# Patient Record
Sex: FEMALE | Race: OTHER
[De-identification: names, ages, dates, MRNs, and addresses within clinical notes are randomized per-mention and may not be internally consistent; named-entity substitution may affect disease eponyms.]

---

## 2017-06-14 ENCOUNTER — RX RENEWAL (OUTPATIENT)
Age: 42
End: 2017-06-14

## 2018-02-20 ENCOUNTER — RX RENEWAL (OUTPATIENT)
Age: 43
End: 2018-02-20

## 2018-09-13 ENCOUNTER — APPOINTMENT (OUTPATIENT)
Dept: UROLOGY | Facility: CLINIC | Age: 43
End: 2018-09-13
Payer: COMMERCIAL

## 2018-09-13 DIAGNOSIS — N72 INFLAMMATORY DISEASE OF CERVIX UTERI: ICD-10-CM

## 2018-09-13 PROCEDURE — 99214 OFFICE O/P EST MOD 30 MIN: CPT

## 2018-09-15 LAB — BACTERIA UR CULT: NORMAL

## 2018-09-17 LAB
APPEARANCE: CLEAR
BACTERIA: NEGATIVE
BILIRUBIN URINE: NEGATIVE
BLOOD URINE: ABNORMAL
COLOR: YELLOW
CORE LAB FLUID CYTOLOGY: NORMAL
GLUCOSE QUALITATIVE U: NEGATIVE MG/DL
HYALINE CASTS: 1 /LPF
KETONES URINE: NEGATIVE
LEUKOCYTE ESTERASE URINE: NEGATIVE
MICROSCOPIC-UA: NORMAL
NITRITE URINE: NEGATIVE
PH URINE: 5.5
PROTEIN URINE: 30 MG/DL
RED BLOOD CELLS URINE: 7 /HPF
SPECIFIC GRAVITY URINE: 1.01
SQUAMOUS EPITHELIAL CELLS: 5 /HPF
UROBILINOGEN URINE: NEGATIVE MG/DL
WHITE BLOOD CELLS URINE: 8 /HPF

## 2018-09-20 LAB — CORE LAB FLUID CYTOLOGY: NORMAL

## 2018-09-22 LAB
APPEARANCE: CLEAR
BACTERIA: NEGATIVE
BILIRUBIN URINE: NEGATIVE
BLOOD URINE: NEGATIVE
COLOR: YELLOW
GLUCOSE QUALITATIVE U: NEGATIVE MG/DL
HYALINE CASTS: 1 /LPF
KETONES URINE: NEGATIVE
LEUKOCYTE ESTERASE URINE: NEGATIVE
MICROSCOPIC-UA: NORMAL
NITRITE URINE: NEGATIVE
PH URINE: 6
PROTEIN URINE: NEGATIVE MG/DL
RED BLOOD CELLS URINE: 10 /HPF
SPECIFIC GRAVITY URINE: 1.02
SQUAMOUS EPITHELIAL CELLS: 8 /HPF
UROBILINOGEN URINE: NEGATIVE MG/DL
WHITE BLOOD CELLS URINE: 5 /HPF

## 2018-10-01 ENCOUNTER — APPOINTMENT (OUTPATIENT)
Dept: UROLOGY | Facility: CLINIC | Age: 43
End: 2018-10-01
Payer: COMMERCIAL

## 2018-10-01 LAB
APPEARANCE: CLEAR
BACTERIA: NEGATIVE
BILIRUBIN URINE: NEGATIVE
BLOOD URINE: ABNORMAL
COLOR: YELLOW
GLUCOSE QUALITATIVE U: NEGATIVE MG/DL
HYALINE CASTS: 0 /LPF
KETONES URINE: NEGATIVE
LEUKOCYTE ESTERASE URINE: NEGATIVE
MICROSCOPIC-UA: NORMAL
NITRITE URINE: NEGATIVE
PH URINE: 6
PROTEIN URINE: NEGATIVE MG/DL
RED BLOOD CELLS URINE: 26 /HPF
SPECIFIC GRAVITY URINE: 1.02
SQUAMOUS EPITHELIAL CELLS: 2 /HPF
UROBILINOGEN URINE: NEGATIVE MG/DL
WHITE BLOOD CELLS URINE: 0 /HPF

## 2018-10-01 PROCEDURE — 99214 OFFICE O/P EST MOD 30 MIN: CPT

## 2018-10-02 LAB — CORE LAB FLUID CYTOLOGY: NORMAL

## 2018-10-03 ENCOUNTER — APPOINTMENT (OUTPATIENT)
Dept: UROLOGY | Facility: CLINIC | Age: 43
End: 2018-10-03

## 2018-10-03 LAB — BACTERIA UR CULT: NORMAL

## 2018-10-08 ENCOUNTER — APPOINTMENT (OUTPATIENT)
Dept: UROLOGY | Facility: CLINIC | Age: 43
End: 2018-10-08

## 2018-11-06 ENCOUNTER — EMERGENCY (EMERGENCY)
Facility: HOSPITAL | Age: 43
LOS: 1 days | Discharge: HOME | End: 2018-11-06
Attending: EMERGENCY MEDICINE
Payer: COMMERCIAL

## 2018-11-06 VITALS
OXYGEN SATURATION: 100 % | SYSTOLIC BLOOD PRESSURE: 112 MMHG | RESPIRATION RATE: 18 BRPM | DIASTOLIC BLOOD PRESSURE: 75 MMHG | HEART RATE: 78 BPM | TEMPERATURE: 99 F

## 2018-11-06 VITALS
SYSTOLIC BLOOD PRESSURE: 120 MMHG | HEIGHT: 65 IN | TEMPERATURE: 98 F | RESPIRATION RATE: 18 BRPM | WEIGHT: 134.92 LBS | OXYGEN SATURATION: 98 % | DIASTOLIC BLOOD PRESSURE: 81 MMHG | HEART RATE: 110 BPM

## 2018-11-06 LAB
ALBUMIN SERPL ELPH-MCNC: 4 G/DL — SIGNIFICANT CHANGE UP (ref 3.3–5)
ALP SERPL-CCNC: 59 U/L — SIGNIFICANT CHANGE UP (ref 40–120)
ALT FLD-CCNC: 14 U/L — SIGNIFICANT CHANGE UP (ref 10–45)
ANION GAP SERPL CALC-SCNC: 15 MMOL/L — SIGNIFICANT CHANGE UP (ref 5–17)
ANISOCYTOSIS BLD QL: SLIGHT — SIGNIFICANT CHANGE UP
APTT BLD: 22.1 SEC — LOW (ref 27.5–36.3)
AST SERPL-CCNC: 60 U/L — HIGH (ref 10–40)
BASOPHILS # BLD AUTO: 0 K/UL — SIGNIFICANT CHANGE UP (ref 0–0.2)
BASOPHILS NFR BLD AUTO: 1 % — SIGNIFICANT CHANGE UP (ref 0–2)
BILIRUB SERPL-MCNC: 0.2 MG/DL — SIGNIFICANT CHANGE UP (ref 0.2–1.2)
BLD GP AB SCN SERPL QL: NEGATIVE — SIGNIFICANT CHANGE UP
BUN SERPL-MCNC: 8 MG/DL — SIGNIFICANT CHANGE UP (ref 7–23)
CALCIUM SERPL-MCNC: 9 MG/DL — SIGNIFICANT CHANGE UP (ref 8.4–10.5)
CHLORIDE SERPL-SCNC: 102 MMOL/L — SIGNIFICANT CHANGE UP (ref 96–108)
CO2 SERPL-SCNC: 22 MMOL/L — SIGNIFICANT CHANGE UP (ref 22–31)
CREAT SERPL-MCNC: 0.59 MG/DL — SIGNIFICANT CHANGE UP (ref 0.5–1.3)
EOSINOPHIL # BLD AUTO: 0 K/UL — SIGNIFICANT CHANGE UP (ref 0–0.5)
EOSINOPHIL NFR BLD AUTO: 1 % — SIGNIFICANT CHANGE UP (ref 0–6)
GLUCOSE SERPL-MCNC: 96 MG/DL — SIGNIFICANT CHANGE UP (ref 70–99)
HCT VFR BLD CALC: 27.6 % — LOW (ref 34.5–45)
HGB BLD-MCNC: 9.3 G/DL — LOW (ref 11.5–15.5)
HYPOCHROMIA BLD QL: SLIGHT — SIGNIFICANT CHANGE UP
INR BLD: 0.92 RATIO — SIGNIFICANT CHANGE UP (ref 0.88–1.16)
LYMPHOCYTES # BLD AUTO: 2 K/UL — SIGNIFICANT CHANGE UP (ref 1–3.3)
LYMPHOCYTES # BLD AUTO: 40.4 % — SIGNIFICANT CHANGE UP (ref 13–44)
MCHC RBC-ENTMCNC: 24.9 PG — LOW (ref 27–34)
MCHC RBC-ENTMCNC: 33.6 GM/DL — SIGNIFICANT CHANGE UP (ref 32–36)
MCV RBC AUTO: 74.2 FL — LOW (ref 80–100)
MICROCYTES BLD QL: SIGNIFICANT CHANGE UP
MONOCYTES # BLD AUTO: 0.4 K/UL — SIGNIFICANT CHANGE UP (ref 0–0.9)
MONOCYTES NFR BLD AUTO: 7.5 % — SIGNIFICANT CHANGE UP (ref 2–14)
NEUTROPHILS # BLD AUTO: 2.6 K/UL — SIGNIFICANT CHANGE UP (ref 1.8–7.4)
NEUTROPHILS NFR BLD AUTO: 50.1 % — SIGNIFICANT CHANGE UP (ref 43–77)
PLAT MORPH BLD: NORMAL — SIGNIFICANT CHANGE UP
PLATELET # BLD AUTO: 316 K/UL — SIGNIFICANT CHANGE UP (ref 150–400)
POLYCHROMASIA BLD QL SMEAR: SLIGHT — SIGNIFICANT CHANGE UP
POTASSIUM SERPL-MCNC: 4.4 MMOL/L — SIGNIFICANT CHANGE UP (ref 3.5–5.3)
POTASSIUM SERPL-SCNC: 4.4 MMOL/L — SIGNIFICANT CHANGE UP (ref 3.5–5.3)
PROT SERPL-MCNC: 7.8 G/DL — SIGNIFICANT CHANGE UP (ref 6–8.3)
PROTHROM AB SERPL-ACNC: 10.6 SEC — SIGNIFICANT CHANGE UP (ref 10–12.9)
RBC # BLD: 3.71 M/UL — LOW (ref 3.8–5.2)
RBC # FLD: 13.4 % — SIGNIFICANT CHANGE UP (ref 10.3–14.5)
RBC BLD AUTO: ABNORMAL
RH IG SCN BLD-IMP: POSITIVE — SIGNIFICANT CHANGE UP
SODIUM SERPL-SCNC: 139 MMOL/L — SIGNIFICANT CHANGE UP (ref 135–145)
WBC # BLD: 5.1 K/UL — SIGNIFICANT CHANGE UP (ref 3.8–10.5)
WBC # FLD AUTO: 5.1 K/UL — SIGNIFICANT CHANGE UP (ref 3.8–10.5)

## 2018-11-06 PROCEDURE — 85610 PROTHROMBIN TIME: CPT

## 2018-11-06 PROCEDURE — 86900 BLOOD TYPING SEROLOGIC ABO: CPT

## 2018-11-06 PROCEDURE — 99284 EMERGENCY DEPT VISIT MOD MDM: CPT | Mod: 25

## 2018-11-06 PROCEDURE — 76830 TRANSVAGINAL US NON-OB: CPT | Mod: 26

## 2018-11-06 PROCEDURE — 99284 EMERGENCY DEPT VISIT MOD MDM: CPT

## 2018-11-06 PROCEDURE — 85730 THROMBOPLASTIN TIME PARTIAL: CPT

## 2018-11-06 PROCEDURE — 76830 TRANSVAGINAL US NON-OB: CPT

## 2018-11-06 PROCEDURE — 80053 COMPREHEN METABOLIC PANEL: CPT

## 2018-11-06 PROCEDURE — 86850 RBC ANTIBODY SCREEN: CPT

## 2018-11-06 PROCEDURE — 85027 COMPLETE CBC AUTOMATED: CPT

## 2018-11-06 PROCEDURE — 86901 BLOOD TYPING SEROLOGIC RH(D): CPT

## 2018-11-06 NOTE — ED PROVIDER NOTE - MEDICAL DECISION MAKING DETAILS
Vaginal bleeding, UCG negative.  Clinically stable.  will check H/H and reassess.  no pain at this time.

## 2018-11-06 NOTE — ED ADULT NURSE NOTE - OBJECTIVE STATEMENT
42 yo female presents to ED from home c/o 2 months of vaginal bleeding. Patient reports she has had increasing fatigue for the past week with ambulating. Patient went to OBGYN today who went her to ED for low H/H. Patient reports this happened 2 years ago, but it improved with an iron infusion. Patient denies CP, n/v/d, fever/chills, recent illness/travel, falls/LOC. Patient A&Ox4, breathing spontaneously, airway patent, bl clear lungs, abdomen nontender, +pulses, cap refill <2 seconds. Patient resting in bed, side rails up, plan of care explained.

## 2018-11-06 NOTE — ED PROVIDER NOTE - PROGRESS NOTE DETAILS
I spoke w the OBGYN resident who then contacted Dr Galvez on call for Anthony. Reviewed all labs and discussed patients symptoms of lethargy. The team agrees with D/C home to follow up with Dr Cruz within the next 3 days. I informed Dr Tirado who agrees and explained the plan to the patient. - Bryn Tom PA-C

## 2018-11-06 NOTE — ED PROVIDER NOTE - CARE PLAN
room air Principal Discharge DX:	Anemia  Assessment and plan of treatment:	Follow up with your Primary Care Physician and OBGYN Dr Cruz within the next 2-3 days  Bring a copy of your test results with you to your appointment  Continue your current medication regimen  Return to the Emergency Room if you experience new or worsening symptoms

## 2018-11-06 NOTE — ED PROVIDER NOTE - OBJECTIVE STATEMENT
43 F reports 2 months of vaginal bleeding which stopped 4 days ago. She has been experiencing fatigue with ambulation for the past 1 week. She went for an appointment with Dr Cruz who sent her to the Emergency Room for low H/H reportedly it was 8. The patient explains that this happened two years ago which improved with an iron infusion. The patient explains that she will not agree with a blood transfusion. 43 F reports 2 months of vaginal bleeding which stopped 4 days ago. She has been experiencing fatigue with ambulation for the past 1 week. She went for an appointment with Dr Cruz who sent her to the Emergency Room for low H/H reportedly it was 8. The patient explains that this happened two years ago which improved with an iron infusion. The patient explains that she will not agree with a blood transfusion.    Attendinyo female presents with vaginal bleeding for 2 weeks.  pt states she saw her doctor today and was told to come to the ED because her hemoglobin was low.  no fever or chills.  no pain.

## 2018-11-06 NOTE — ED PROVIDER NOTE - PLAN OF CARE
Follow up with your Primary Care Physician and OBGYN Dr Cruz within the next 2-3 days  Bring a copy of your test results with you to your appointment  Continue your current medication regimen  Return to the Emergency Room if you experience new or worsening symptoms

## 2018-11-12 ENCOUNTER — FORM ENCOUNTER (OUTPATIENT)
Age: 43
End: 2018-11-12

## 2018-11-13 ENCOUNTER — APPOINTMENT (OUTPATIENT)
Dept: CT IMAGING | Facility: IMAGING CENTER | Age: 43
End: 2018-11-13
Payer: COMMERCIAL

## 2018-11-13 ENCOUNTER — OUTPATIENT (OUTPATIENT)
Dept: OUTPATIENT SERVICES | Facility: HOSPITAL | Age: 43
LOS: 1 days | End: 2018-11-13
Payer: COMMERCIAL

## 2018-11-13 DIAGNOSIS — R31.29 OTHER MICROSCOPIC HEMATURIA: ICD-10-CM

## 2018-11-13 PROCEDURE — 74178 CT ABD&PLV WO CNTR FLWD CNTR: CPT

## 2018-11-13 PROCEDURE — 74178 CT ABD&PLV WO CNTR FLWD CNTR: CPT | Mod: 26

## 2018-12-11 ENCOUNTER — APPOINTMENT (OUTPATIENT)
Dept: VASCULAR SURGERY | Facility: CLINIC | Age: 43
End: 2018-12-11
Payer: COMMERCIAL

## 2018-12-11 VITALS
HEART RATE: 99 BPM | WEIGHT: 137.13 LBS | BODY MASS INDEX: 24.6 KG/M2 | HEIGHT: 62.75 IN | OXYGEN SATURATION: 98 % | DIASTOLIC BLOOD PRESSURE: 59 MMHG | SYSTOLIC BLOOD PRESSURE: 90 MMHG | TEMPERATURE: 98.3 F

## 2018-12-11 PROCEDURE — 93979 VASCULAR STUDY: CPT

## 2018-12-11 PROCEDURE — 99243 OFF/OP CNSLTJ NEW/EST LOW 30: CPT

## 2018-12-18 ENCOUNTER — TRANSCRIPTION ENCOUNTER (OUTPATIENT)
Age: 43
End: 2018-12-18

## 2018-12-21 ENCOUNTER — APPOINTMENT (OUTPATIENT)
Dept: OBGYN | Facility: CLINIC | Age: 43
End: 2018-12-21
Payer: COMMERCIAL

## 2018-12-21 PROCEDURE — 99243 OFF/OP CNSLTJ NEW/EST LOW 30: CPT

## 2019-01-08 ENCOUNTER — APPOINTMENT (OUTPATIENT)
Dept: VASCULAR SURGERY | Facility: CLINIC | Age: 44
End: 2019-01-08
Payer: COMMERCIAL

## 2019-01-08 VITALS
BODY MASS INDEX: 24.77 KG/M2 | HEIGHT: 62.6 IN | TEMPERATURE: 98.2 F | OXYGEN SATURATION: 99 % | WEIGHT: 138.06 LBS | HEART RATE: 81 BPM | DIASTOLIC BLOOD PRESSURE: 58 MMHG | SYSTOLIC BLOOD PRESSURE: 90 MMHG

## 2019-01-08 PROCEDURE — 93979 VASCULAR STUDY: CPT

## 2019-01-08 PROCEDURE — 99213 OFFICE O/P EST LOW 20 MIN: CPT

## 2019-02-04 ENCOUNTER — OUTPATIENT (OUTPATIENT)
Dept: OUTPATIENT SERVICES | Facility: HOSPITAL | Age: 44
LOS: 1 days | End: 2019-02-04
Payer: COMMERCIAL

## 2019-02-04 VITALS
OXYGEN SATURATION: 98 % | HEIGHT: 62 IN | DIASTOLIC BLOOD PRESSURE: 83 MMHG | RESPIRATION RATE: 16 BRPM | WEIGHT: 139.99 LBS | SYSTOLIC BLOOD PRESSURE: 114 MMHG | TEMPERATURE: 98 F | HEART RATE: 93 BPM

## 2019-02-04 DIAGNOSIS — Z90.89 ACQUIRED ABSENCE OF OTHER ORGANS: Chronic | ICD-10-CM

## 2019-02-04 DIAGNOSIS — Z98.51 TUBAL LIGATION STATUS: Chronic | ICD-10-CM

## 2019-02-04 DIAGNOSIS — I82.90 ACUTE EMBOLISM AND THROMBOSIS OF UNSPECIFIED VEIN: ICD-10-CM

## 2019-02-04 DIAGNOSIS — Z01.818 ENCOUNTER FOR OTHER PREPROCEDURAL EXAMINATION: ICD-10-CM

## 2019-02-04 DIAGNOSIS — D25.9 LEIOMYOMA OF UTERUS, UNSPECIFIED: ICD-10-CM

## 2019-02-04 LAB
ANION GAP SERPL CALC-SCNC: 13 MMOL/L — SIGNIFICANT CHANGE UP (ref 5–17)
BLD GP AB SCN SERPL QL: NEGATIVE — SIGNIFICANT CHANGE UP
BUN SERPL-MCNC: 15 MG/DL — SIGNIFICANT CHANGE UP (ref 7–23)
CALCIUM SERPL-MCNC: 9.6 MG/DL — SIGNIFICANT CHANGE UP (ref 8.4–10.5)
CHLORIDE SERPL-SCNC: 101 MMOL/L — SIGNIFICANT CHANGE UP (ref 96–108)
CO2 SERPL-SCNC: 26 MMOL/L — SIGNIFICANT CHANGE UP (ref 22–31)
CREAT SERPL-MCNC: 0.74 MG/DL — SIGNIFICANT CHANGE UP (ref 0.5–1.3)
GLUCOSE SERPL-MCNC: 74 MG/DL — SIGNIFICANT CHANGE UP (ref 70–99)
POTASSIUM SERPL-MCNC: 4.3 MMOL/L — SIGNIFICANT CHANGE UP (ref 3.5–5.3)
POTASSIUM SERPL-SCNC: 4.3 MMOL/L — SIGNIFICANT CHANGE UP (ref 3.5–5.3)
RH IG SCN BLD-IMP: POSITIVE — SIGNIFICANT CHANGE UP
SODIUM SERPL-SCNC: 140 MMOL/L — SIGNIFICANT CHANGE UP (ref 135–145)

## 2019-02-04 PROCEDURE — 86901 BLOOD TYPING SEROLOGIC RH(D): CPT

## 2019-02-04 PROCEDURE — 86850 RBC ANTIBODY SCREEN: CPT

## 2019-02-04 PROCEDURE — 80048 BASIC METABOLIC PNL TOTAL CA: CPT

## 2019-02-04 PROCEDURE — 87086 URINE CULTURE/COLONY COUNT: CPT

## 2019-02-04 PROCEDURE — 85027 COMPLETE CBC AUTOMATED: CPT

## 2019-02-04 PROCEDURE — G0463: CPT

## 2019-02-04 PROCEDURE — 86900 BLOOD TYPING SEROLOGIC ABO: CPT

## 2019-02-04 PROCEDURE — 83036 HEMOGLOBIN GLYCOSYLATED A1C: CPT

## 2019-02-04 RX ORDER — PREGABALIN 225 MG/1
1 CAPSULE ORAL
Qty: 0 | Refills: 0 | COMMUNITY

## 2019-02-04 RX ORDER — CEFOTETAN DISODIUM 1 G
2 VIAL (EA) INJECTION ONCE
Qty: 0 | Refills: 0 | Status: DISCONTINUED | OUTPATIENT
Start: 2019-02-12 | End: 2019-02-27

## 2019-02-04 NOTE — H&P PST ADULT - ATTENDING COMMENTS
Reviewed plan for laparoscopic supracervical hysterectomy, bilateral salpingectomy, possible laparotomy, possible cystoscopy

## 2019-02-04 NOTE — H&P PST ADULT - NSANTHOSAYNRD_GEN_A_CORE
No. [place] screening performed.  STOP BANG Legend: 0-2 = LOW Risk; 3-4 = INTERMEDIATE Risk; 5-8 = HIGH Risk

## 2019-02-04 NOTE — H&P PST ADULT - HISTORY OF PRESENT ILLNESS
This is a 42 y/o female with PMH:  Iron Deficeincy Anemia: s/p Iron Infussion 12/2018 and current p.o. Iron supplement daily,  Fibroid Uterus, Right Ovarian Vein Thrombosis: s/p ( 1/2019): evaluation by Dr. Bro, vascular and same in communication with surgeon. Current dx: increased size of Uterine Fibroid. Scheduled: Laproscopic Suprcervical Hysterectomy/ Bilateral Salpingectomy/ Cystoscopy/ Possible Exploratory Laparotomy.

## 2019-02-04 NOTE — H&P PST ADULT - PROBLEM SELECTOR PLAN 1
Laproscopic Supracervical Hysterectomy/ Bialteral Salpingectomy  Cystoscopy/ Possible Exploratory Laparotomy

## 2019-02-04 NOTE — H&P PST ADULT - VENOUS THROMBOEMBOLISM FOR WOMEN ONLY
4:13 PM    Reason for Call: Phone Call    Description: Patient would like to speak to the nurse regarding the extreme pain she is in. If you could call back at your earliest convenience [phone]    Was an appointment offered for this call? No    Preferred method for responding to this message: Telephone Call    If we cannot reach you directly, may we leave a detailed response at the number you provided? Yes    Can this message wait until your PCP/provider returns, if available today? YES,     Rocio Quan       (0) indicator not present

## 2019-02-04 NOTE — H&P PST ADULT - PMH
History of frequent urinary tract infections  currently on Tamsulosin. Last UTI 6/2018: treated ; resolved  Iron (Fe) deficiency anemia  s/p Iron Transfusion 12/2018  Normal spontaneous vaginal delivery  ' 97 and '99  Thrombosis  dx: 12/2018  Chronic Ovarian Vein Thrombosis: Right -sided Fibroid, uterine    History of frequent urinary tract infections  currently on Tamsulosin. Last UTI 6/2018: treated ; resolved  Iron (Fe) deficiency anemia  s/p Iron Infusion 12/2018  Normal spontaneous vaginal delivery  ' 97 and '99  Thrombosis  dx: 12/2018  Chronic Ovarian Vein Thrombosis: Right -sided

## 2019-02-04 NOTE — H&P PST ADULT - PROBLEM SELECTOR PLAN 2
dx: chronic Ovarian Vein Thrombosis: right-sided  PLAN: Vascular evaluation done by Dr. BRONWYN Bro 1/2019: (note on chart): same in communication with surgeon.

## 2019-02-05 PROBLEM — D50.9 IRON DEFICIENCY ANEMIA, UNSPECIFIED: Chronic | Status: ACTIVE | Noted: 2018-11-06

## 2019-02-05 LAB
CULTURE RESULTS: SIGNIFICANT CHANGE UP
HBA1C BLD-MCNC: 5 % — SIGNIFICANT CHANGE UP (ref 4–5.6)
HCT VFR BLD CALC: 40.2 % — SIGNIFICANT CHANGE UP (ref 34.5–45)
HGB BLD-MCNC: 12.9 G/DL — SIGNIFICANT CHANGE UP (ref 11.5–15.5)
MCHC RBC-ENTMCNC: 27.8 PG — SIGNIFICANT CHANGE UP (ref 27–34)
MCHC RBC-ENTMCNC: 32.1 GM/DL — SIGNIFICANT CHANGE UP (ref 32–36)
MCV RBC AUTO: 86.6 FL — SIGNIFICANT CHANGE UP (ref 80–100)
PLATELET # BLD AUTO: 374 K/UL — SIGNIFICANT CHANGE UP (ref 150–400)
RBC # BLD: 4.64 M/UL — SIGNIFICANT CHANGE UP (ref 3.8–5.2)
RBC # FLD: 20.4 % — HIGH (ref 10.3–14.5)
SPECIMEN SOURCE: SIGNIFICANT CHANGE UP
WBC # BLD: 6.39 K/UL — SIGNIFICANT CHANGE UP (ref 3.8–10.5)
WBC # FLD AUTO: 6.39 K/UL — SIGNIFICANT CHANGE UP (ref 3.8–10.5)

## 2019-02-11 ENCOUNTER — TRANSCRIPTION ENCOUNTER (OUTPATIENT)
Age: 44
End: 2019-02-11

## 2019-02-12 ENCOUNTER — OUTPATIENT (OUTPATIENT)
Dept: OUTPATIENT SERVICES | Facility: HOSPITAL | Age: 44
LOS: 1 days | End: 2019-02-12
Payer: COMMERCIAL

## 2019-02-12 ENCOUNTER — APPOINTMENT (OUTPATIENT)
Dept: OBGYN | Facility: HOSPITAL | Age: 44
End: 2019-02-12

## 2019-02-12 ENCOUNTER — RESULT REVIEW (OUTPATIENT)
Age: 44
End: 2019-02-12

## 2019-02-12 VITALS
DIASTOLIC BLOOD PRESSURE: 80 MMHG | OXYGEN SATURATION: 98 % | SYSTOLIC BLOOD PRESSURE: 122 MMHG | HEART RATE: 80 BPM | RESPIRATION RATE: 18 BRPM | TEMPERATURE: 98 F

## 2019-02-12 VITALS
TEMPERATURE: 97 F | OXYGEN SATURATION: 100 % | HEIGHT: 62 IN | SYSTOLIC BLOOD PRESSURE: 134 MMHG | RESPIRATION RATE: 16 BRPM | DIASTOLIC BLOOD PRESSURE: 80 MMHG | WEIGHT: 139.99 LBS | HEART RATE: 82 BPM

## 2019-02-12 DIAGNOSIS — Z98.51 TUBAL LIGATION STATUS: Chronic | ICD-10-CM

## 2019-02-12 DIAGNOSIS — D25.9 LEIOMYOMA OF UTERUS, UNSPECIFIED: ICD-10-CM

## 2019-02-12 DIAGNOSIS — Z90.89 ACQUIRED ABSENCE OF OTHER ORGANS: Chronic | ICD-10-CM

## 2019-02-12 LAB — GLUCOSE BLDC GLUCOMTR-MCNC: 92 MG/DL — SIGNIFICANT CHANGE UP (ref 70–99)

## 2019-02-12 PROCEDURE — 52000 CYSTOURETHROSCOPY: CPT | Mod: 59

## 2019-02-12 PROCEDURE — 82962 GLUCOSE BLOOD TEST: CPT

## 2019-02-12 PROCEDURE — 58542 LSH W/T/O UT 250 G OR LESS: CPT | Mod: 80

## 2019-02-12 PROCEDURE — C1889: CPT

## 2019-02-12 PROCEDURE — 88302 TISSUE EXAM BY PATHOLOGIST: CPT

## 2019-02-12 PROCEDURE — 52000 CYSTOURETHROSCOPY: CPT | Mod: 80,59

## 2019-02-12 PROCEDURE — 58542 LSH W/T/O UT 250 G OR LESS: CPT

## 2019-02-12 PROCEDURE — 88307 TISSUE EXAM BY PATHOLOGIST: CPT

## 2019-02-12 PROCEDURE — 88302 TISSUE EXAM BY PATHOLOGIST: CPT | Mod: 26

## 2019-02-12 PROCEDURE — 88307 TISSUE EXAM BY PATHOLOGIST: CPT | Mod: 26

## 2019-02-12 RX ORDER — OXYCODONE HYDROCHLORIDE 5 MG/1
5 TABLET ORAL ONCE
Qty: 0 | Refills: 0 | Status: DISCONTINUED | OUTPATIENT
Start: 2019-02-12 | End: 2019-02-12

## 2019-02-12 RX ORDER — SODIUM CHLORIDE 9 MG/ML
1000 INJECTION, SOLUTION INTRAVENOUS
Qty: 0 | Refills: 0 | Status: DISCONTINUED | OUTPATIENT
Start: 2019-02-12 | End: 2019-02-27

## 2019-02-12 RX ORDER — ACETAMINOPHEN 500 MG
1000 TABLET ORAL ONCE
Qty: 0 | Refills: 0 | Status: DISCONTINUED | OUTPATIENT
Start: 2019-02-12 | End: 2019-02-12

## 2019-02-12 RX ORDER — ACETAMINOPHEN 500 MG
3 TABLET ORAL
Qty: 0 | Refills: 0 | COMMUNITY
Start: 2019-02-12

## 2019-02-12 RX ORDER — KETOROLAC TROMETHAMINE 30 MG/ML
15 SYRINGE (ML) INJECTION ONCE
Qty: 0 | Refills: 0 | Status: DISCONTINUED | OUTPATIENT
Start: 2019-02-12 | End: 2019-02-27

## 2019-02-12 RX ORDER — ACETAMINOPHEN 500 MG
1000 TABLET ORAL ONCE
Qty: 0 | Refills: 0 | Status: DISCONTINUED | OUTPATIENT
Start: 2019-02-12 | End: 2019-02-27

## 2019-02-12 RX ORDER — SODIUM CHLORIDE 9 MG/ML
3 INJECTION INTRAMUSCULAR; INTRAVENOUS; SUBCUTANEOUS EVERY 8 HOURS
Qty: 0 | Refills: 0 | Status: DISCONTINUED | OUTPATIENT
Start: 2019-02-12 | End: 2019-02-12

## 2019-02-12 RX ORDER — HYDROMORPHONE HYDROCHLORIDE 2 MG/ML
0.5 INJECTION INTRAMUSCULAR; INTRAVENOUS; SUBCUTANEOUS
Qty: 0 | Refills: 0 | Status: DISCONTINUED | OUTPATIENT
Start: 2019-02-12 | End: 2019-02-12

## 2019-02-12 RX ORDER — TAMSULOSIN HYDROCHLORIDE 0.4 MG/1
1 CAPSULE ORAL
Qty: 0 | Refills: 0 | COMMUNITY

## 2019-02-12 RX ORDER — ENOXAPARIN SODIUM 100 MG/ML
40 INJECTION SUBCUTANEOUS
Qty: 560 | Refills: 0 | OUTPATIENT
Start: 2019-02-12 | End: 2019-02-25

## 2019-02-12 RX ORDER — FAMOTIDINE 10 MG/ML
20 INJECTION INTRAVENOUS ONCE
Qty: 0 | Refills: 0 | Status: COMPLETED | OUTPATIENT
Start: 2019-02-12 | End: 2019-02-12

## 2019-02-12 RX ORDER — METOCLOPRAMIDE HCL 10 MG
10 TABLET ORAL ONCE
Qty: 0 | Refills: 0 | Status: COMPLETED | OUTPATIENT
Start: 2019-02-12 | End: 2019-02-12

## 2019-02-12 RX ORDER — KETOROLAC TROMETHAMINE 30 MG/ML
30 SYRINGE (ML) INJECTION EVERY 6 HOURS
Qty: 0 | Refills: 0 | Status: COMPLETED | OUTPATIENT
Start: 2019-02-12 | End: 2019-02-12

## 2019-02-12 RX ORDER — ONDANSETRON 8 MG/1
4 TABLET, FILM COATED ORAL ONCE
Qty: 0 | Refills: 0 | Status: DISCONTINUED | OUTPATIENT
Start: 2019-02-12 | End: 2019-02-12

## 2019-02-12 RX ORDER — CELECOXIB 200 MG/1
200 CAPSULE ORAL ONCE
Qty: 0 | Refills: 0 | Status: COMPLETED | OUTPATIENT
Start: 2019-02-12 | End: 2019-02-12

## 2019-02-12 RX ORDER — OXYCODONE HYDROCHLORIDE 5 MG/1
1 TABLET ORAL
Qty: 10 | Refills: 0 | OUTPATIENT
Start: 2019-02-12

## 2019-02-12 RX ORDER — KETOROLAC TROMETHAMINE 30 MG/ML
15 SYRINGE (ML) INJECTION ONCE
Qty: 0 | Refills: 0 | Status: DISCONTINUED | OUTPATIENT
Start: 2019-02-12 | End: 2019-02-12

## 2019-02-12 RX ORDER — ACETAMINOPHEN 500 MG
975 TABLET ORAL ONCE
Qty: 0 | Refills: 0 | Status: COMPLETED | OUTPATIENT
Start: 2019-02-12 | End: 2019-02-12

## 2019-02-12 RX ORDER — DIPHENHYDRAMINE HCL 50 MG
12.5 CAPSULE ORAL ONCE
Qty: 0 | Refills: 0 | Status: COMPLETED | OUTPATIENT
Start: 2019-02-12 | End: 2019-02-12

## 2019-02-12 RX ORDER — ACETAMINOPHEN 500 MG
975 TABLET ORAL EVERY 6 HOURS
Qty: 0 | Refills: 0 | Status: DISCONTINUED | OUTPATIENT
Start: 2019-02-12 | End: 2019-02-27

## 2019-02-12 RX ORDER — IBUPROFEN 200 MG
1 TABLET ORAL
Qty: 20 | Refills: 0 | OUTPATIENT
Start: 2019-02-12

## 2019-02-12 RX ORDER — ONDANSETRON 8 MG/1
4 TABLET, FILM COATED ORAL ONCE
Qty: 0 | Refills: 0 | Status: COMPLETED | OUTPATIENT
Start: 2019-02-12 | End: 2019-02-12

## 2019-02-12 RX ADMIN — ONDANSETRON 4 MILLIGRAM(S): 8 TABLET, FILM COATED ORAL at 11:42

## 2019-02-12 RX ADMIN — Medication 975 MILLIGRAM(S): at 07:33

## 2019-02-12 RX ADMIN — SODIUM CHLORIDE 150 MILLILITER(S): 9 INJECTION, SOLUTION INTRAVENOUS at 14:55

## 2019-02-12 RX ADMIN — HYDROMORPHONE HYDROCHLORIDE 0.5 MILLIGRAM(S): 2 INJECTION INTRAMUSCULAR; INTRAVENOUS; SUBCUTANEOUS at 11:42

## 2019-02-12 RX ADMIN — HYDROMORPHONE HYDROCHLORIDE 0.5 MILLIGRAM(S): 2 INJECTION INTRAMUSCULAR; INTRAVENOUS; SUBCUTANEOUS at 12:12

## 2019-02-12 RX ADMIN — Medication 30 MILLIGRAM(S): at 11:53

## 2019-02-12 RX ADMIN — FAMOTIDINE 20 MILLIGRAM(S): 10 INJECTION INTRAVENOUS at 12:21

## 2019-02-12 RX ADMIN — Medication 12.5 MILLIGRAM(S): at 12:21

## 2019-02-12 RX ADMIN — CELECOXIB 200 MILLIGRAM(S): 200 CAPSULE ORAL at 07:34

## 2019-02-12 RX ADMIN — Medication 30 MILLIGRAM(S): at 12:23

## 2019-02-12 RX ADMIN — FAMOTIDINE 20 MILLIGRAM(S): 10 INJECTION INTRAVENOUS at 07:34

## 2019-02-12 RX ADMIN — Medication 10 MILLIGRAM(S): at 15:58

## 2019-02-12 NOTE — ASU DISCHARGE PLAN (ADULT/PEDIATRIC). - MEDICATION SUMMARY - MEDICATIONS TO TAKE
I will START or STAY ON the medications listed below when I get home from the hospital:    Vitamin B-12  -- 1 tab(s) by mouth once a day  -- Indication: For home med    Biotin  10,000 micrograms  -- 1 tab(s) by mouth once a day  -- Indication: For home med    Iron  -- 1 tab(s) by mouth once a day  -- Indication: For home med    ibuprofen 600 mg oral tablet  -- 1 tab(s) by mouth every 6 hours MDD:4  -- Do not take this drug if you are pregnant.  It is very important that you take or use this exactly as directed.  Do not skip doses or discontinue unless directed by your doctor.  May cause drowsiness or dizziness.  Obtain medical advice before taking any non-prescription drugs as some may affect the action of this medication.  Take with food or milk.    -- Indication: For pain    acetaminophen 325 mg oral tablet  -- 3 tab(s) by mouth every 6 hours  -- Indication: For pain    oxyCODONE 5 mg oral tablet  -- 1 tab(s) by mouth every 6 hours, As Needed -for severe pain MDD:2   -- Caution federal law prohibits the transfer of this drug to any person other  than the person for whom it was prescribed.  It is very important that you take or use this exactly as directed.  Do not skip doses or discontinue unless directed by your doctor.  May cause drowsiness.  Alcohol may intensify this effect.  Use care when operating dangerous machinery.  This prescription cannot be refilled.  Using more of this medication than prescribed may cause serious breathing problems.    -- Indication: For severe pain    Lovenox 40 mg/0.4 mL injectable solution  -- 40 milligram(s) subcutaneously once a day MDD:40mg  -- It is very important that you take or use this exactly as directed.  Do not skip doses or discontinue unless directed by your doctor.    -- Indication: For clot prevention    Multiple Vitamins oral tablet  -- 1 tab(s) by mouth once a day  -- Indication: For home med

## 2019-02-12 NOTE — CHART NOTE - NSCHARTNOTEFT_GEN_A_CORE
GYN POST-OP CHECK  JA CHARLES    [id number]    No Known Allergies    Intolerances    S: Pt sleeping comfortably in bed with sister at bedside. Pt reports minimal abdominal pain; She received Toradol. She also c/o nausea for which RN administered Zofran and Benadryl. Pt denies N/V, SOB, CP, palpitations. Tolerates clears.  Not OOB yet. No flatus yet.     O:   T(C): 36.2 (19 @ 11:05), Max: 36.2 (19 @ 11:05)  HR: 84 (19 @ 14:00) (60 - 87)  BP: 99/55 (19 @ 14:00) (96/51 - 110/65)  RR: 18 (19 @ 14:00) (18 - 18)  SpO2: 96% (19 @ 14:00) (95% - 100%)    I&O's Summary    2019 07:01  -  2019 14:20  --------------------------------------------------------  IN: 150 mL / OUT: 0 mL / NET: 150 mL      Gen: NAD. A+Ox3.  CV: S1S2, RRR  Lungs: CTA B/L  Abd: +BS. soft, gently distended and appropriately tender.  Inc: 3 port sites -  Clean/dry/intact w/ Dermabond.  : No vaginal bleeding.  Ext: PAS in place b/l.     Operative Findings:  EUA: 10 week sized myomatous uterus, mobile. Intraop: General abdominal survey within normal limits, uterus myomatous, 10cm in size. Supracervical hysterectomy performed with bilateral salpingectomy. Bilateral ureters visualized to be vermiculating far from operative site. Cervix oversewn with 0 vicryl--extracorporeal knot tying performed. Specimen placed in endocatch bag, removed from abdomen using the EXCITE technique. Emiliano sprayed on cuff. Excellent hemostasis noted on reduced intraabdominal pressure. Cysto: bladder intact, without suture material. Bilateral ureteral jets visualized.      A/P: 44y Female w/ pmhx fibroids,   and  w/ tubal ligation, Right sided chronic ovarian vein thrombosis, h/o frequent UTI on suppression, iron-deficiency anemia now POD#0 s/p LSC RAE, B/L salpingectomy and cysto. EBL 25cc. Doing well.    1. Neuro: Analgesia PRN. acetaminophen   Tablet .. 975 milliGRAM(s) Oral every 6 hours  acetaminophen  IVPB .. 1000 milliGRAM(s) IV Intermittent once  HYDROmorphone  Injectable 0.5 milliGRAM(s) IV Push every 30 minutes PRN  ketorolac   Injectable 15 milliGRAM(s) IV Push once  ketorolac   Injectable 30 milliGRAM(s) IV Push every 6 hours  oxyCODONE    IR 5 milliGRAM(s) Oral once PRN  2. Card: Monitor VS.   3. Pulm: Incentive spirometer use.  4. GI: Advance to regular diet. Anti-emetics PRN.  5. : Due to void by 6pm.  6. Electrolytes: LR@125cc/hr.  7. DVT ppx w/ PAS while in bed. Early ambulation, initially with assistance then as tolerated.  8. Discharge from PACU when criteria met.   d/w GYN team.  DEANA Redman GYN POST-OP CHECK  JA CHARLES    [id number]    No Known Allergies    Intolerances    S: Pt sleeping comfortably in bed with sister at bedside. Pt reports minimal abdominal pain; She received Toradol. She also c/o nausea for which RN administered Zofran and Benadryl. Pt denies N/V, SOB, CP, palpitations. Tolerates clears.  Not OOB yet. No flatus yet.     O:   T(C): 36.2 (19 @ 11:05), Max: 36.2 (19 @ 11:05)  HR: 84 (19 @ 14:00) (60 - 87)  BP: 99/55 (19 @ 14:00) (96/51 - 110/65)  RR: 18 (19 @ 14:00) (18 - 18)  SpO2: 96% (19 @ 14:00) (95% - 100%)    I&O's Summary    2019 07:01  -  2019 14:20  --------------------------------------------------------  IN: 150 mL / OUT: 0 mL / NET: 150 mL      Gen: NAD. A+Ox3.  CV: S1S2, RRR  Lungs: CTA B/L  Abd: +BS. soft, gently distended and appropriately tender.  Inc: 3 port sites -  Clean/dry/intact w/ Dermabond.  : No vaginal bleeding.  Ext: PAS in place b/l.     Operative Findings:  EUA: 10 week sized myomatous uterus, mobile. Intraop: General abdominal survey within normal limits, uterus myomatous, 10cm in size. Supracervical hysterectomy performed with bilateral salpingectomy. Bilateral ureters visualized to be vermiculating far from operative site. Cervix oversewn with 0 vicryl--extracorporeal knot tying performed. Specimen placed in endocatch bag, removed from abdomen using the EXCITE technique. Emiliano sprayed on cuff. Excellent hemostasis noted on reduced intraabdominal pressure. Cysto: bladder intact, without suture material. Bilateral ureteral jets visualized.      A/P: 44y Female w/ pmhx fibroids,   and  w/ tubal ligation, Right sided chronic ovarian vein thrombosis, h/o frequent UTI on suppression, iron-deficiency anemia now POD#0 s/p LSC RAE, B/L salpingectomy and cysto. EBL 25cc. Doing well.    1. Neuro: Analgesia PRN. acetaminophen   Tablet .. 975 milliGRAM(s) Oral every 6 hours  acetaminophen  IVPB .. 1000 milliGRAM(s) IV Intermittent once  HYDROmorphone  Injectable 0.5 milliGRAM(s) IV Push every 30 minutes PRN  ketorolac   Injectable 15 milliGRAM(s) IV Push once  ketorolac   Injectable 30 milliGRAM(s) IV Push every 6 hours  oxyCODONE    IR 5 milliGRAM(s) Oral once PRN  2. Card: Monitor VS.   3. Pulm: Incentive spirometer use.  4. GI: Advance to regular diet. Anti-emetics PRN.  5. : Due to void by 6pm.  6. Electrolytes: LR@125cc/hr.  7. DVT ppx w/ PAS while in bed. Early ambulation, initially with assistance then as tolerated. Lovenox 40 mg SQ daily.   8. Discharge from PACU when criteria met.   d/w GYN team.  DEANA Redman

## 2019-02-12 NOTE — BRIEF OPERATIVE NOTE - OPERATION/FINDINGS
EUA: 10 week sized myomatous uterus, mobile. Intraop: General abdominal survey within normal limits, uterus myomatous, 10cm in size. Supracervical hysterectomy performed with bilateral salpingectomy. Bilateral ureters visualized to be vermiculating far from operative site. Cervix oversewn with 0 vicryl--extracorporeal knot tying performed. Specimen placed in endocatch bag, removed from abdomen using the EXCITE technique. Emiliano sprayed on cuff. Excellent hemostasis noted on reduced intraabdominal pressure. Cysto: bladder intact, without suture material. Bilateral ureteral jets visualized.

## 2019-02-12 NOTE — ASU DISCHARGE PLAN (ADULT/PEDIATRIC). - ITEMS TO FOLLOWUP WITH YOUR PHYSICIAN'S
Follow up with Dr Kelly in 2 weeks. Call the office for appointment confirmation and with any concerns. You should expect some light vaginal bleeding and cramping and gas pain. Take tylenol and ibuprofen alternating every 3 hours (each drug not more than every 6 hrs) to control pain. Oxycodone can be take as needed for severe pain.

## 2019-02-12 NOTE — ASU DISCHARGE PLAN (ADULT/PEDIATRIC). - NOTIFY
Unable to Urinate/GYN Fever>100.4/Inability to Tolerate Liquids or Foods/Bleeding that does not stop/Persistent Nausea and Vomiting

## 2019-02-12 NOTE — ASU DISCHARGE PLAN (ADULT/PEDIATRIC). - ACTIVITY LEVEL
nothing per vagina/no tub baths/nothing per rectum/weight bearing as tolerated/no douching/no intercourse/no tampons/2 weeks

## 2019-02-12 NOTE — BRIEF OPERATIVE NOTE - PROCEDURE
<<-----Click on this checkbox to enter Procedure Cystoscopy  02/12/2019    Active  [email or web address]  Diagnostic laparoscopy by gynecology  02/12/2019    Active  [email or web address]  Bilateral salpingectomy  02/12/2019    Active  [email or web address]  Supracervical hysterectomy  02/12/2019    Active  [email or web address]

## 2019-02-12 NOTE — BRIEF OPERATIVE NOTE - POST-OP DX
Intramural and subserous leiomyoma of uterus  02/12/2019    Active  Josefina Bhatia  Menorrhagia with regular cycle  02/12/2019    Active  Josefina Bhatia

## 2019-02-12 NOTE — PRE-ANESTHESIA EVALUATION ADULT - NSANTHPMHFT_GEN_ALL_CORE
Denies SOB or CP.  Denies recent URI symptoms.  >4 METS.  PMHx: s/p Fe tx (12/19), R ovarian v. thrombosis

## 2019-02-25 ENCOUNTER — APPOINTMENT (OUTPATIENT)
Dept: OBGYN | Facility: CLINIC | Age: 44
End: 2019-02-25
Payer: COMMERCIAL

## 2019-02-25 PROCEDURE — 99024 POSTOP FOLLOW-UP VISIT: CPT

## 2019-02-27 NOTE — ASU DISCHARGE PLAN (ADULT/PEDIATRIC). - MEDICATION SUMMARY - MEDICATIONS TO CHANGE
I will SWITCH the dose or number of times a day I take the medications listed below when I get home from the hospital:  None
Is This A New Presentation, Or A Follow-Up?: Skin Lesions
How Severe Is Your Skin Lesion?: mild
Have Your Skin Lesions Been Treated?: not been treated

## 2019-03-05 PROBLEM — D25.9 LEIOMYOMA OF UTERUS, UNSPECIFIED: Chronic | Status: ACTIVE | Noted: 2019-02-04

## 2019-03-05 PROBLEM — I82.90 ACUTE EMBOLISM AND THROMBOSIS OF UNSPECIFIED VEIN: Chronic | Status: ACTIVE | Noted: 2019-02-04

## 2019-03-05 PROBLEM — Z87.440 PERSONAL HISTORY OF URINARY (TRACT) INFECTIONS: Chronic | Status: ACTIVE | Noted: 2019-02-04

## 2019-03-20 ENCOUNTER — APPOINTMENT (OUTPATIENT)
Dept: OBGYN | Facility: CLINIC | Age: 44
End: 2019-03-20
Payer: COMMERCIAL

## 2019-03-20 PROCEDURE — 99024 POSTOP FOLLOW-UP VISIT: CPT

## 2019-04-02 ENCOUNTER — APPOINTMENT (OUTPATIENT)
Dept: UROLOGY | Facility: CLINIC | Age: 44
End: 2019-04-02
Payer: COMMERCIAL

## 2019-04-02 PROCEDURE — 99214 OFFICE O/P EST MOD 30 MIN: CPT

## 2019-04-02 NOTE — ASSESSMENT
[FreeTextEntry1] : Mrs Thomas is a 44 year old female presented for follow of incomplete bladder emptying and UTI. The patient takes tamsulosin 0.4 mg one half hour after meal twice daily. The patient continued to take nitrofurantoin macrocrystal 100 mg, just prior to or after sexual intercourse to prevent UTI. She reported she has not had any recent UTI. The patient has not had any gross hematuria. She reported to having rare episodes of dysuria. The patient wakes 1-2 times nightly to urinate. She reported that she is emptying better now with medication than in the past. \par 9/13/18: The patient returned today and reported she had an UTI recently and was prescribed levofloxacin. Complained of pelvic pain when sitting for long periods of time. Complained of pain inside the vagina, feels like 'the feeling you get right before you push a baby out during labor.' Denied vaginal discharge. Noted her UTIs may no be related to sex. Takes nitrofurantoin before sex. Currently taking tamsulosin twice daily. Denied chance of pregnancy. \par 10/01/18: The patient returned and reported her dysuria is resolving. Complained of mild headaches after starting Doxycycline. Had 3 days left for treatment. Denied vaginal itching. \par \par 4/2/19: The patient returned today and reported that she has had dysuria. Notes that a urine culture on 3/20/19 was negative. Has light discharge. Started taking AZO one day ago, which hasn't helped her burning. Currently taking Tamsulosin. Patient denies gross hematuria, urgency, or incontinence. 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 Notes that after the surgery, the patient had a UTI. Patient took Amoxicillin and three days worth of Fluconazole. Patient's dysuria started after the surgery, but still didn't resolve after finishing her trial of antibiotics.\par \par I prescribed the patient Fluconazole 100 mg, one tablet twice the first day and then one tablet once daily until finished.\par Patient will discontinue use of AZO.\par \par The patient produced a urine sample which will be sent for urinalysis, urine cytology, and urine culture.\par \par The patient will return in 2 weeks for a follow up.

## 2019-04-02 NOTE — HISTORY OF PRESENT ILLNESS
[FreeTextEntry1] : Mrs Thomas is a 44 year old female presented for follow of incomplete bladder emptying and UTI. The patient takes tamsulosin 0.4 mg one half hour after meal twice daily. The patient continued to take nitrofurantoin macrocrystal 100 mg, just prior to or after sexual intercourse to prevent UTI. She reported she has not had any recent UTI. The patient has not had any gross hematuria. She reported to having rare episodes of dysuria. The patient wakes 1-2 times nightly to urinate. She reported that she is emptying better now with medication than in the past. \par 9/13/18: The patient returned today and reported she had an UTI recently and was prescribed levofloxacin. Complained of pelvic pain when sitting for long periods of time. Complained of pain inside the vagina, feels like 'the feeling you get right before you push a baby out during labor.' Denied vaginal discharge. Noted her UTIs may no be related to sex. Takes nitrofurantoin before sex. Currently taking tamsulosin twice daily. Denied chance of pregnancy. \par 10/01/18: The patient returned and reported her dysuria is resolving. Complained of mild headaches after starting Doxycycline. Had 3 days left for treatment. Denied vaginal itching. \par \par 4/2/19: The patient returned today and reported that she has had dysuria. Notes that a urine culture on 3/20/19 was negative. Has light discharge. Started taking AZO one day ago, which hasn't helped her burning. Currently taking Tamsulosin. Patient denies gross hematuria, urgency, or incontinence. 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 Notes that after the surgery, the patient had a UTI. Patient took Amoxicillin and three days worth of Fluconazole. Patient's dysuria started after the surgery, but still didn't resolve after finishing her trial of antibiotics.

## 2019-04-02 NOTE — REVIEW OF SYSTEMS
[Heartburn] : heartburn [Dysuria] : dysuria [Pelvic Pain] : pelvic pain [Negative] : Heme/Lymph [Feeling Poorly] : not feeling poorly [Feeling Tired] : not feeling tired [Eyesight Problems] : no eyesight problems [Sore Throat] : no sore throat [Chest Pain] : no chest pain [Constipation] : no constipation [Vaginal Discharge] : no vaginal discharge

## 2019-04-02 NOTE — PHYSICAL EXAM
[General Appearance - Well Developed] : well developed [General Appearance - Well Nourished] : well nourished [Normal Appearance] : normal appearance [Well Groomed] : well groomed [General Appearance - In No Acute Distress] : no acute distress [Abdomen Soft] : soft [Abdomen Tenderness] : non-tender [Costovertebral Angle Tenderness] : no ~M costovertebral angle tenderness [Skin Color & Pigmentation] : normal skin color and pigmentation [Edema] : no peripheral edema [] : no respiratory distress [Respiration, Rhythm And Depth] : normal respiratory rhythm and effort [Exaggerated Use Of Accessory Muscles For Inspiration] : no accessory muscle use [Oriented To Time, Place, And Person] : oriented to person, place, and time [Affect] : the affect was normal [Mood] : the mood was normal [Not Anxious] : not anxious [Normal Station and Gait] : the gait and station were normal for the patient's age [No Focal Deficits] : no focal deficits [Cervical Lymph Nodes Enlarged Posterior Bilaterally] : posterior cervical [Cervical Lymph Nodes Enlarged Anterior Bilaterally] : anterior cervical [Supraclavicular Lymph Nodes Enlarged Bilaterally] : supraclavicular [FreeTextEntry1] : No submandibular gland tenderness. \par

## 2019-04-02 NOTE — ADDENDUM
[FreeTextEntry1] : This note was authored by Ruth Urbina working as a scribe for Dr. Danny Baker.\par I, Dr. Danny Baker, have reviewed the content of this note and confirm it is true and accurate. I personally performed the history and physical examination and made all the decisions.\par 4/2/19\par

## 2019-04-05 LAB
APPEARANCE: CLEAR
BACTERIA UR CULT: NORMAL
BACTERIA: NEGATIVE
BILIRUBIN URINE: NEGATIVE
BLOOD URINE: NEGATIVE
COLOR: ABNORMAL
GLUCOSE QUALITATIVE U: NEGATIVE
HYALINE CASTS: 2 /LPF
KETONES URINE: NEGATIVE
LEUKOCYTE ESTERASE URINE: NEGATIVE
MICROSCOPIC-UA: NORMAL
NITRITE URINE: NEGATIVE
PH URINE: 7
PROTEIN URINE: NORMAL
RED BLOOD CELLS URINE: 3 /HPF
SPECIFIC GRAVITY URINE: 1.02
SQUAMOUS EPITHELIAL CELLS: 5 /HPF
URINE CYTOLOGY: NORMAL
UROBILINOGEN URINE: NORMAL
WHITE BLOOD CELLS URINE: 1 /HPF

## 2019-04-16 ENCOUNTER — APPOINTMENT (OUTPATIENT)
Dept: UROLOGY | Facility: CLINIC | Age: 44
End: 2019-04-16
Payer: COMMERCIAL

## 2019-04-16 PROCEDURE — 99214 OFFICE O/P EST MOD 30 MIN: CPT

## 2019-04-16 NOTE — ASSESSMENT
[FreeTextEntry1] : Mrs Thomas is a 44 year old female presented for follow of incomplete bladder emptying and UTI. The patient takes tamsulosin 0.4 mg one half hour after meal twice daily. The patient continued to take nitrofurantoin macrocrystal 100 mg, just prior to or after sexual intercourse to prevent UTI. She reported she has not had any recent UTI. The patient has not had any gross hematuria. She reported to having rare episodes of dysuria. The patient wakes 1-2 times nightly to urinate. She reported that she is emptying better now with medication than in the past. \par 9/13/18: The patient returned today and reported she had an UTI recently and was prescribed levofloxacin. Complained of pelvic pain when sitting for long periods of time. Complained of pain inside the vagina, feels like 'the feeling you get right before you push a baby out during labor.' Denied vaginal discharge. Noted her UTIs may no be related to sex. Takes nitrofurantoin before sex. Currently taking tamsulosin twice daily. Denied chance of pregnancy. \par 10/01/18: The patient returned and reported her dysuria is resolving. Complained of mild headaches after starting Doxycycline. Had 3 days left for treatment. Denied vaginal itching. \par 4/2/19: The patient returned and reported that she has had dysuria. Noted that a urine culture on 3/20/19 was negative. Has light discharge. Started taking AZO one day ago, which hasn't helped her burning. Currently taking Tamsulosin. Patient denied gross hematuria, urgency, or incontinence. 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 Noted that after the surgery, the patient had a UTI. Patient took Amoxicillin and three days worth of Fluconazole. Patient's dysuria started after the surgery, but still didn't resolve after finishing her trial of antibiotics.\par \par 4/16/19: The patient returned today and reported that her symptoms haven't improved since taking Fluconazole. Finished the trial four days ago. The burning occurs after urination. Denies burning during urination. Takes Tamsulosin BID. Complains of lower lumbar pain bilaterally especially paraspinous, but no spine pain. Denies tobacco use.\par \par Along with taking Tamsulosin BID, I prescribed the patient Oxybutynin ER 5 mg, one tablet daily. I informed the patient that the side effects may include dry mouth and constipation. \par \par The patient produced a urine sample which will be sent for urinalysis, urine cytology, and urine culture.\par A PVR test by bladder scan found 5 ml of urine in the bladder.\par \par The patient will return in 2 weeks for a follow up.

## 2019-04-16 NOTE — PHYSICAL EXAM
[General Appearance - Well Developed] : well developed [General Appearance - Well Nourished] : well nourished [Normal Appearance] : normal appearance [Well Groomed] : well groomed [General Appearance - In No Acute Distress] : no acute distress [Abdomen Soft] : soft [Abdomen Tenderness] : non-tender [Costovertebral Angle Tenderness] : no ~M costovertebral angle tenderness [Skin Color & Pigmentation] : normal skin color and pigmentation [Edema] : no peripheral edema [] : no respiratory distress [Respiration, Rhythm And Depth] : normal respiratory rhythm and effort [Oriented To Time, Place, And Person] : oriented to person, place, and time [Exaggerated Use Of Accessory Muscles For Inspiration] : no accessory muscle use [Mood] : the mood was normal [Affect] : the affect was normal [Normal Station and Gait] : the gait and station were normal for the patient's age [Not Anxious] : not anxious [No Focal Deficits] : no focal deficits [Cervical Lymph Nodes Enlarged Anterior Bilaterally] : anterior cervical [Cervical Lymph Nodes Enlarged Posterior Bilaterally] : posterior cervical [Supraclavicular Lymph Nodes Enlarged Bilaterally] : supraclavicular [FreeTextEntry1] : No submandibular gland tenderness. \par

## 2019-04-16 NOTE — REVIEW OF SYSTEMS
[Heartburn] : heartburn [Pelvic Pain] : pelvic pain [Dysuria] : dysuria [Negative] : Heme/Lymph [Feeling Poorly] : not feeling poorly [Feeling Tired] : not feeling tired [Eyesight Problems] : no eyesight problems [Sore Throat] : no sore throat [Chest Pain] : no chest pain [Constipation] : no constipation [Vaginal Discharge] : no vaginal discharge

## 2019-04-16 NOTE — ADDENDUM
[FreeTextEntry1] : This note was authored by Ruth Urbina working as a scribe for Dr. Danny Baker.\par I, Dr. Danny Baker, have reviewed the content of this note and confirm it is true and accurate. I personally performed the history and physical examination and made all the decisions.\par 4/16/19\par

## 2019-04-16 NOTE — HISTORY OF PRESENT ILLNESS
[FreeTextEntry1] : Mrs Thomas is a 44 year old female presented for follow of incomplete bladder emptying and UTI. The patient takes tamsulosin 0.4 mg one half hour after meal twice daily. The patient continued to take nitrofurantoin macrocrystal 100 mg, just prior to or after sexual intercourse to prevent UTI. She reported she has not had any recent UTI. The patient has not had any gross hematuria. She reported to having rare episodes of dysuria. The patient wakes 1-2 times nightly to urinate. She reported that she is emptying better now with medication than in the past. \par 9/13/18: The patient returned today and reported she had an UTI recently and was prescribed levofloxacin. Complained of pelvic pain when sitting for long periods of time. Complained of pain inside the vagina, feels like 'the feeling you get right before you push a baby out during labor.' Denied vaginal discharge. Noted her UTIs may no be related to sex. Takes nitrofurantoin before sex. Currently taking tamsulosin twice daily. Denied chance of pregnancy. \par 10/01/18: The patient returned and reported her dysuria is resolving. Complained of mild headaches after starting Doxycycline. Had 3 days left for treatment. Denied vaginal itching. \par 4/2/19: The patient returned and reported that she has had dysuria. Noted that a urine culture on 3/20/19 was negative. Has light discharge. Started taking AZO one day ago, which hasn't helped her burning. Currently taking Tamsulosin. Patient denied gross hematuria, urgency, or incontinence. 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 Noted that after the surgery, the patient had a UTI. Patient took Amoxicillin and three days worth of Fluconazole. Patient's dysuria started after the surgery, but still didn't resolve after finishing her trial of antibiotics.\par \par 4/16/19: The patient returned today and reported that her symptoms haven't improved since taking Fluconazole. Finished the trial four days ago. The burning occurs after urination. Denies burning during urination. Takes Tamsulosin BID. Complains of lower lumbar pain bilaterally especially paraspinous, but no spine pain. Denies tobacco use.

## 2019-04-21 LAB
APPEARANCE: CLEAR
BACTERIA UR CULT: ABNORMAL
BACTERIA: NEGATIVE
BILIRUBIN URINE: NEGATIVE
BLOOD URINE: NORMAL
COLOR: YELLOW
GLUCOSE QUALITATIVE U: NEGATIVE
HYALINE CASTS: 3 /LPF
KETONES URINE: NEGATIVE
LEUKOCYTE ESTERASE URINE: NEGATIVE
MICROSCOPIC-UA: NORMAL
NITRITE URINE: NEGATIVE
PH URINE: 6
PROTEIN URINE: NORMAL
RED BLOOD CELLS URINE: 4 /HPF
SPECIFIC GRAVITY URINE: 1.03
SQUAMOUS EPITHELIAL CELLS: 6 /HPF
URINE CYTOLOGY: NORMAL
UROBILINOGEN URINE: NORMAL
WHITE BLOOD CELLS URINE: 1 /HPF

## 2019-04-24 ENCOUNTER — APPOINTMENT (OUTPATIENT)
Dept: UROLOGY | Facility: CLINIC | Age: 44
End: 2019-04-24

## 2019-04-30 ENCOUNTER — APPOINTMENT (OUTPATIENT)
Dept: UROLOGY | Facility: CLINIC | Age: 44
End: 2019-04-30
Payer: COMMERCIAL

## 2019-04-30 PROCEDURE — 99214 OFFICE O/P EST MOD 30 MIN: CPT

## 2019-04-30 NOTE — HISTORY OF PRESENT ILLNESS
[FreeTextEntry1] : Mrs Thomas is a 44 year old female presented for follow of incomplete bladder emptying and UTI. The patient takes tamsulosin 0.4 mg one half hour after meal twice daily. The patient continued to take nitrofurantoin macrocrystal 100 mg, just prior to or after sexual intercourse to prevent UTI. She reported she has not had any recent UTI. The patient has not had any gross hematuria. She reported to having rare episodes of dysuria. The patient wakes 1-2 times nightly to urinate. She reported that she is emptying better now with medication than in the past. \par 9/13/18: The patient returned today and reported she had an UTI recently and was prescribed levofloxacin. Complained of pelvic pain when sitting for long periods of time. Complained of pain inside the vagina, feels like 'the feeling you get right before you push a baby out during labor.' Denied vaginal discharge. Noted her UTIs may no be related to sex. Takes nitrofurantoin before sex. Currently taking tamsulosin twice daily. Denied chance of pregnancy. \par 10/01/18: The patient returned and reported her dysuria is resolving. Complained of mild headaches after starting Doxycycline. Had 3 days left for treatment. Denied vaginal itching. \par 4/2/19: The patient returned and reported that she has had dysuria. Noted that a urine culture on 3/20/19 was negative. Has light discharge. Started taking AZO one day ago, which hasn't helped her burning. Currently taking Tamsulosin. Patient denied gross hematuria, urgency, or incontinence. 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 Noted that after the surgery, the patient had a UTI. Patient took Amoxicillin and three days worth of Fluconazole. Patient's dysuria started after the surgery, but still didn't resolve after finishing her trial of antibiotics.\par 4/16/19: The patient returned and reported that her symptoms haven't improved since taking Fluconazole. Finished the trial four days ago. The burning occurs after urination. Denied burning during urination. Takes Tamsulosin BID. Complained of lower lumbar pain bilaterally especially paraspinous, but no spine pain. Denied tobacco use.\par \par 4/30/19: The patient returned today and reported that taking Oxybutynin ER 5 mg has improved the burning after urination by 70%. The pain used to be a severity of 10/10 and now it is 3/10. Has slight dry mouth. Denies constipation. Also taking Tamsulosin BID.

## 2019-04-30 NOTE — PHYSICAL EXAM
[General Appearance - Well Developed] : well developed [General Appearance - Well Nourished] : well nourished [Normal Appearance] : normal appearance [Well Groomed] : well groomed [General Appearance - In No Acute Distress] : no acute distress [Abdomen Soft] : soft [Abdomen Tenderness] : non-tender [Costovertebral Angle Tenderness] : no ~M costovertebral angle tenderness [Skin Color & Pigmentation] : normal skin color and pigmentation [Edema] : no peripheral edema [] : no respiratory distress [Respiration, Rhythm And Depth] : normal respiratory rhythm and effort [Exaggerated Use Of Accessory Muscles For Inspiration] : no accessory muscle use [Oriented To Time, Place, And Person] : oriented to person, place, and time [Affect] : the affect was normal [Mood] : the mood was normal [Not Anxious] : not anxious [Normal Station and Gait] : the gait and station were normal for the patient's age [No Focal Deficits] : no focal deficits [Cervical Lymph Nodes Enlarged Posterior Bilaterally] : posterior cervical [Cervical Lymph Nodes Enlarged Anterior Bilaterally] : anterior cervical [Supraclavicular Lymph Nodes Enlarged Bilaterally] : supraclavicular [No Palpable Adenopathy] : no palpable adenopathy [FreeTextEntry1] : No submandibular gland tenderness. \par

## 2019-04-30 NOTE — ADDENDUM
[FreeTextEntry1] : This note was authored by Ruth Urbina working as a scribe for Dr. Danny Baker.\par I, Dr. Danny Baker, have reviewed the content of this note and confirm it is true and accurate. I personally performed the history and physical examination and made all the decisions.\par 4/30/19\par

## 2019-05-21 ENCOUNTER — APPOINTMENT (OUTPATIENT)
Dept: UROLOGY | Facility: CLINIC | Age: 44
End: 2019-05-21
Payer: COMMERCIAL

## 2019-05-21 ENCOUNTER — APPOINTMENT (OUTPATIENT)
Dept: VASCULAR SURGERY | Facility: CLINIC | Age: 44
End: 2019-05-21

## 2019-05-21 PROCEDURE — 99214 OFFICE O/P EST MOD 30 MIN: CPT

## 2019-05-21 NOTE — ADDENDUM
[FreeTextEntry1] : This note was authored by Jeol Ramos working as a medical scribe for Dr. Danny Baker. The note was reviewed, edited, and revised by Dr. Danny Baker who is in agreement with the exam findings, and treatment plan. (5/21/19)

## 2019-05-21 NOTE — PHYSICAL EXAM
[General Appearance - Well Developed] : well developed [General Appearance - Well Nourished] : well nourished [Normal Appearance] : normal appearance [Well Groomed] : well groomed [General Appearance - In No Acute Distress] : no acute distress [Abdomen Soft] : soft [Abdomen Tenderness] : non-tender [Costovertebral Angle Tenderness] : no ~M costovertebral angle tenderness [Skin Color & Pigmentation] : normal skin color and pigmentation [Edema] : no peripheral edema [] : no respiratory distress [Respiration, Rhythm And Depth] : normal respiratory rhythm and effort [Exaggerated Use Of Accessory Muscles For Inspiration] : no accessory muscle use [Oriented To Time, Place, And Person] : oriented to person, place, and time [Affect] : the affect was normal [Mood] : the mood was normal [Not Anxious] : not anxious [Normal Station and Gait] : the gait and station were normal for the patient's age [No Focal Deficits] : no focal deficits [No Palpable Adenopathy] : no palpable adenopathy [Cervical Lymph Nodes Enlarged Posterior Bilaterally] : posterior cervical [Cervical Lymph Nodes Enlarged Anterior Bilaterally] : anterior cervical [Supraclavicular Lymph Nodes Enlarged Bilaterally] : supraclavicular [FreeTextEntry1] : No submandibular gland tenderness. \par

## 2019-05-21 NOTE — ASSESSMENT
[FreeTextEntry1] : Mrs Thomas is a 44 year old female presented for follow of incomplete bladder emptying and UTI. The patient takes tamsulosin 0.4 mg one half hour after meal twice daily. The patient continued to take nitrofurantoin macrocrystal 100 mg, just prior to or after sexual intercourse to prevent UTI. She reported she has not had any recent UTI. The patient has not had any gross hematuria. She reported to having rare episodes of dysuria. The patient wakes 1-2 times nightly to urinate. She reported that she is emptying better now with medication than in the past. \par 9/13/18: The patient returned today and reported she had an UTI recently and was prescribed levofloxacin. Complained of pelvic pain when sitting for long periods of time. Complained of pain inside the vagina, feels like 'the feeling you get right before you push a baby out during labor.' Denied vaginal discharge. She previously had  UTIs  related to sex. Now Takes nitrofurantoin before sex. Currently taking tamsulosin twice daily. Denied chance of pregnancy. \par 10/01/18: The patient returned and reported her dysuria is resolving. Complained of mild headaches after starting Doxycycline. Had 3 days left for treatment. Denied vaginal itching. \par 4/2/19: The patient returned and reported that she has had dysuria. Noted that a urine culture on 3/20/19 was negative. Has light discharge. Started taking AZO one day ago, which hasn't helped her burning. Currently taking Tamsulosin. Patient denied gross hematuria, urgency, or incontinence. 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 Noted that after the surgery, the patient had a UTI. Patient took Amoxicillin and three days worth of Fluconazole. Patient's dysuria started after the surgery, but still didn't resolve after finishing her trial of antibiotics.\par 4/16/19: The patient returned and reported that her symptoms haven't improved since taking Fluconazole. Finished the trial four days ago. The burning occurs after urination. Denied burning during urination. Takes Tamsulosin BID. Complained of lower lumbar pain bilaterally especially paraspinous, but no spine pain. Denied tobacco use.\par 4/30/19: The patient returned today and reported that taking Oxybutynin ER 5 mg has improved the burning after urination by 70%. The pain used to be a severity of 10/10 and now it is 3/10. Has slight dry mouth. Denies constipation. Also taking Tamsulosin BID.\par \par 5/21/19: Female patient presents today for a follow up. Since her last visit she reports that her current medical regimen, which includes Tamsulosin 0.4 mg and Oxybutynin 10 mg continue to provide relief from her urinary symptoms. On occasion she still notes that there is a slight uncomfortable feeling during urination. Dry mouth is reported as a result of taking Oxybutynin and notes that it was less on the lower dose. She denies hematuria, urgency, burning during urination, and frequency. She wakes up once a night to urinate. Occasional intermittency is reported. \par \par The patient produced a urine sample which will be sent for urinalysis, urine cytology and urine culture. \par \par PVR completed: 152 mL on first test. 152 mL on second test. Not a distended or irregular shape. Within acceptable range. \par \par Along with taking Tamsulosin BID, the Oxybutynin ER 10 mg, one tablet daily are to be continued. \par \par The patient will return in 3 weeks for a follow up.

## 2019-05-22 LAB
APPEARANCE: CLEAR
BACTERIA UR CULT: NORMAL
BACTERIA: NEGATIVE
BILIRUBIN URINE: NEGATIVE
BLOOD URINE: NEGATIVE
COLOR: YELLOW
GLUCOSE QUALITATIVE U: NEGATIVE
HYALINE CASTS: 0 /LPF
KETONES URINE: NEGATIVE
LEUKOCYTE ESTERASE URINE: NEGATIVE
MICROSCOPIC-UA: NORMAL
NITRITE URINE: NEGATIVE
PH URINE: 6.5
PROTEIN URINE: NORMAL
RED BLOOD CELLS URINE: 2 /HPF
SPECIFIC GRAVITY URINE: 1.02
SQUAMOUS EPITHELIAL CELLS: 0 /HPF
UROBILINOGEN URINE: NORMAL
WHITE BLOOD CELLS URINE: 1 /HPF

## 2019-05-25 LAB — URINE CYTOLOGY: NORMAL

## 2019-06-11 ENCOUNTER — APPOINTMENT (OUTPATIENT)
Dept: VASCULAR SURGERY | Facility: CLINIC | Age: 44
End: 2019-06-11
Payer: COMMERCIAL

## 2019-06-11 VITALS
TEMPERATURE: 98.1 F | DIASTOLIC BLOOD PRESSURE: 61 MMHG | HEIGHT: 62.6 IN | HEART RATE: 67 BPM | BODY MASS INDEX: 24.68 KG/M2 | SYSTOLIC BLOOD PRESSURE: 94 MMHG | WEIGHT: 137.56 LBS | OXYGEN SATURATION: 99 %

## 2019-06-11 PROCEDURE — 99213 OFFICE O/P EST LOW 20 MIN: CPT

## 2019-07-16 ENCOUNTER — APPOINTMENT (OUTPATIENT)
Dept: UROLOGY | Facility: CLINIC | Age: 44
End: 2019-07-16

## 2019-08-19 ENCOUNTER — APPOINTMENT (OUTPATIENT)
Dept: UROLOGY | Facility: CLINIC | Age: 44
End: 2019-08-19

## 2019-09-26 ENCOUNTER — APPOINTMENT (OUTPATIENT)
Dept: UROLOGY | Facility: CLINIC | Age: 44
End: 2019-09-26
Payer: COMMERCIAL

## 2019-09-26 VITALS
BODY MASS INDEX: 25.21 KG/M2 | SYSTOLIC BLOOD PRESSURE: 123 MMHG | TEMPERATURE: 97.7 F | DIASTOLIC BLOOD PRESSURE: 79 MMHG | HEIGHT: 62 IN | RESPIRATION RATE: 17 BRPM | HEART RATE: 73 BPM | WEIGHT: 137 LBS

## 2019-09-26 DIAGNOSIS — Z87.440 PERSONAL HISTORY OF URINARY (TRACT) INFECTIONS: ICD-10-CM

## 2019-09-26 PROCEDURE — 99214 OFFICE O/P EST MOD 30 MIN: CPT

## 2019-09-26 RX ORDER — FLUCONAZOLE 100 MG/1
100 TABLET ORAL
Qty: 11 | Refills: 0 | Status: COMPLETED | COMMUNITY
Start: 2019-04-02 | End: 2019-09-26

## 2019-09-26 RX ORDER — DOXYCYCLINE HYCLATE 100 MG/1
100 CAPSULE ORAL
Qty: 28 | Refills: 0 | Status: COMPLETED | COMMUNITY
Start: 2018-09-13 | End: 2019-09-26

## 2019-09-28 LAB
APPEARANCE: CLEAR
BACTERIA UR CULT: NORMAL
BACTERIA: NEGATIVE
BILIRUBIN URINE: NEGATIVE
BLOOD URINE: NEGATIVE
COLOR: NORMAL
GLUCOSE QUALITATIVE U: NEGATIVE
HYALINE CASTS: 0 /LPF
KETONES URINE: NEGATIVE
LEUKOCYTE ESTERASE URINE: NEGATIVE
MICROSCOPIC-UA: NORMAL
NITRITE URINE: NEGATIVE
PH URINE: 6.5
PROTEIN URINE: NEGATIVE
RED BLOOD CELLS URINE: 1 /HPF
SPECIFIC GRAVITY URINE: 1.01
SQUAMOUS EPITHELIAL CELLS: 1 /HPF
UROBILINOGEN URINE: NORMAL
WHITE BLOOD CELLS URINE: 0 /HPF

## 2019-10-02 LAB — URINE CYTOLOGY: NORMAL

## 2019-10-10 NOTE — ASSESSMENT
[FreeTextEntry1] : Mrs Thomas is a 44 year old female presented for follow of incomplete bladder emptying and UTI. The patient takes tamsulosin 0.4 mg one half hour after meal twice daily. The patient continued to take nitrofurantoin macrocrystal 100 mg, just prior to or after sexual intercourse to prevent UTI. She reported she has not had any recent UTI. The patient has not had any gross hematuria. She reported to having rare episodes of dysuria. The patient wakes 1-2 times nightly to urinate. She reported that she is emptying better now with medication than in the past. \par 9/13/18: The patient returned today and reported she had an UTI recently and was prescribed levofloxacin. Complained of pelvic pain when sitting for long periods of time. Complained of pain inside the vagina, feels like 'the feeling you get right before you push a baby out during labor.' Denied vaginal discharge. Noted her UTIs may no be related to sex. Takes nitrofurantoin before sex. Currently taking tamsulosin twice daily. Denied chance of pregnancy. \par 10/01/18: The patient returned and reported her dysuria is resolving. Complained of mild headaches after starting Doxycycline. Had 3 days left for treatment. Denied vaginal itching. \par 4/2/19: The patient returned and reported that she has had dysuria. Noted that a urine culture on 3/20/19 was negative. Has light discharge. Started taking AZO one day ago, which hasn't helped her burning. Currently taking Tamsulosin. Patient denied gross hematuria, urgency, or incontinence. 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 Noted that after the surgery, the patient had a UTI. Patient took Amoxicillin and three days worth of Fluconazole. Patient's dysuria started after the surgery, but still didn't resolve after finishing her trial of antibiotics.\par 4/16/19: The patient returned and reported that her symptoms haven't improved since taking Fluconazole. Finished the trial four days ago. The burning occurs after urination. Denied burning during urination. Takes Tamsulosin BID. Complained of lower lumbar pain bilaterally especially paraspinous, but no spine pain. Denied tobacco use.\par 4/30/19: The patient returned today and reported that taking Oxybutynin ER 5 mg has improved the burning after urination by 70%. The pain used to be a severity of 10/10 and now it is 3/10. Has slight dry mouth. Denies constipation. Also taking Tamsulosin BID.\par 5/21/19: Female patient presents today for a follow up. Since her last visit she reports that her current medical regimen, which includes Tamsulosin 0.4 mg and Oxybutynin 10 mg continue to provide relief from her urinary symptoms. On occasion she still notes that there is a slight uncomfortable feeling during urination. Dry mouth is reported as a result of taking Oxybutynin and notes that it was less on the lower dose. She denies hematuria, urgency, burning during urination, and frequency. She wakes up once a night to urinate. Occasional intermittency is reported. \par \par 9/26/19: Patient presents today for a follow up. Today she states that a UTI occurred since her last visit. She had Nitrofurantoin at home and began taking one tablet BID once the symptoms began. She took it at that dose for 5 days and no relief was experienced. On the sixth day she began taking two tablets BID and that dosage relieved her symptoms. She called and spoke with a nurse to see if that dosage was a proper dose but does not feel as if her questions were understood. Oxybutynin is continued as directed with benefit. \par \par The patient produced a urine sample which will be sent for urinalysis, urine cytology and urine culture. \par I have advised that she call back in 3 days to discuss the results of the urine culture. The prescribed antibiotic will be changed appropriately. \par \par No changes are made to her current medical regimen. She is to continue taking Oxybutynin and Tamsulosin as directed. \par \par If her symptoms resolve she can follow up in 3 months. If she continues to be symptomatic than I have advised that she return in 3 weeks for another evaluation.

## 2019-10-10 NOTE — HISTORY OF PRESENT ILLNESS
[FreeTextEntry1] : Mrs Thomas is a 44 year old female presented for follow of incomplete bladder emptying and UTI. The patient takes tamsulosin 0.4 mg one half hour after meal twice daily. The patient continued to take nitrofurantoin macrocrystal 100 mg, just prior to or after sexual intercourse to prevent UTI. She reported she has not had any recent UTI. The patient has not had any gross hematuria. She reported to having rare episodes of dysuria. The patient wakes 1-2 times nightly to urinate. She reported that she is emptying better now with medication than in the past. \par 9/13/18: The patient returned today and reported she had an UTI recently and was prescribed levofloxacin. Complained of pelvic pain when sitting for long periods of time. Complained of pain inside the vagina, feels like 'the feeling you get right before you push a baby out during labor.' Denied vaginal discharge. Noted her UTIs may no be related to sex. Takes nitrofurantoin before sex. Currently taking tamsulosin twice daily. Denied chance of pregnancy. \par 10/01/18: The patient returned and reported her dysuria is resolving. Complained of mild headaches after starting Doxycycline. Had 3 days left for treatment. Denied vaginal itching. \par 4/2/19: The patient returned and reported that she has had dysuria. Noted that a urine culture on 3/20/19 was negative. Has light discharge. Started taking AZO one day ago, which hasn't helped her burning. Currently taking Tamsulosin. Patient denied gross hematuria, urgency, or incontinence. 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 Noted that after the surgery, the patient had a UTI. Patient took Amoxicillin and three days worth of Fluconazole. Patient's dysuria started after the surgery, but still didn't resolve after finishing her trial of antibiotics.\par 4/16/19: The patient returned and reported that her symptoms haven't improved since taking Fluconazole. Finished the trial four days ago. The burning occurs after urination. Denied burning during urination. Takes Tamsulosin BID. Complained of lower lumbar pain bilaterally especially paraspinous, but no spine pain. Denied tobacco use.\par 4/30/19: The patient returned today and reported that taking Oxybutynin ER 5 mg has improved the burning after urination by 70%. The pain used to be a severity of 10/10 and now it is 3/10. Has slight dry mouth. Denies constipation. Also taking Tamsulosin BID.\par 5/21/19: Female patient presents today for a follow up. Since her last visit she reports that her current medical regimen, which includes Tamsulosin 0.4 mg and Oxybutynin 10 mg continue to provide relief from her urinary symptoms. On occasion she still notes that there is a slight uncomfortable feeling during urination. Dry mouth is reported as a result of taking Oxybutynin and notes that it was less on the lower dose. She denies hematuria, urgency, burning during urination, and frequency. She wakes up once a night to urinate. Occasional intermittency is reported. \par \par 9/26/19: Patient presents today for a follow up. Today she states that a UTI occurred since her last visit. She had Nitrofurantoin at home and began taking one tablet BID once the symptoms began. She took it at that dose for 5 days and no relief was experienced. On the sixth day she began taking two tablets BID and that dosage relieved her symptoms. She called and spoke with a nurse to see if that dosage was a proper dose but does not feel as if her questions were understood. Oxybutynin is continued as directed with benefit.

## 2019-10-10 NOTE — PHYSICAL EXAM
[General Appearance - Well Nourished] : well nourished [General Appearance - Well Developed] : well developed [Normal Appearance] : normal appearance [Well Groomed] : well groomed [General Appearance - In No Acute Distress] : no acute distress [Abdomen Tenderness] : non-tender [Abdomen Soft] : soft [Skin Color & Pigmentation] : normal skin color and pigmentation [Edema] : no peripheral edema [] : no respiratory distress [Exaggerated Use Of Accessory Muscles For Inspiration] : no accessory muscle use [Respiration, Rhythm And Depth] : normal respiratory rhythm and effort [Oriented To Time, Place, And Person] : oriented to person, place, and time [Affect] : the affect was normal [Mood] : the mood was normal [Not Anxious] : not anxious [Normal Station and Gait] : the gait and station were normal for the patient's age [No Focal Deficits] : no focal deficits [No Palpable Adenopathy] : no palpable adenopathy [Supraclavicular Lymph Nodes Enlarged Bilaterally] : supraclavicular [Cervical Lymph Nodes Enlarged Anterior Bilaterally] : anterior cervical [Cervical Lymph Nodes Enlarged Posterior Bilaterally] : posterior cervical [FreeTextEntry1] : No submandibular gland tenderness. \par

## 2019-10-10 NOTE — REVIEW OF SYSTEMS
[Dysuria] : dysuria [Heartburn] : heartburn [Pelvic Pain] : pelvic pain [Negative] : Endocrine [Feeling Poorly] : not feeling poorly [Feeling Tired] : not feeling tired [Eyesight Problems] : no eyesight problems [Sore Throat] : no sore throat [Chest Pain] : no chest pain [Constipation] : no constipation [Vaginal Discharge] : no vaginal discharge

## 2019-10-10 NOTE — ADDENDUM
[FreeTextEntry1] : This note was authored by Joel Ramos working as scribe for Dr. Danny Baker. I, Dr. Danny Baker, have reviewed the content of this note and confirm it is true and accurate. I personally performed the history and physical examination and made all the decisions.\par 9/26/19

## 2020-04-26 LAB
APPEARANCE: CLEAR
BACTERIA UR CULT: NORMAL
BACTERIA: NEGATIVE
BILIRUBIN URINE: NEGATIVE
BLOOD URINE: NORMAL
COLOR: NORMAL
GLUCOSE QUALITATIVE U: NEGATIVE
HYALINE CASTS: 0 /LPF
KETONES URINE: NEGATIVE
LEUKOCYTE ESTERASE URINE: NEGATIVE
MICROSCOPIC-UA: NORMAL
NITRITE URINE: NEGATIVE
PH URINE: 6
PROTEIN URINE: NEGATIVE
RED BLOOD CELLS URINE: 3 /HPF
SPECIFIC GRAVITY URINE: 1.02
SQUAMOUS EPITHELIAL CELLS: 2 /HPF
UROBILINOGEN URINE: NORMAL
WHITE BLOOD CELLS URINE: 0 /HPF

## 2020-04-28 ENCOUNTER — APPOINTMENT (OUTPATIENT)
Dept: UROLOGY | Facility: CLINIC | Age: 45
End: 2020-04-28
Payer: COMMERCIAL

## 2020-04-28 PROCEDURE — 99214 OFFICE O/P EST MOD 30 MIN: CPT | Mod: 95

## 2020-04-28 NOTE — ASSESSMENT
[FreeTextEntry1] : patient with urinary frequency and incomplete bladder emptying managed with oxybutynin ER 10 mg and tamsulosin o.4 mg 2x/d 1/2 hour after meals.patient was doing well so six months ago stopped oxybutynin and continued to do well. recently about a week or two ago patient experienced dysuria and continued pain for awhile after conclusion of micturition. and radiated into lower abdomen. Patient resumed oxybutynin and experienced mild improvement.\par \par Patient denies constipation, chest pain, urinary urgency. She has had one episode of slight urinary stress leakage with a forceful sneeze.\par \par I will prescribe desipramine 10 mg at bedtime. Telemedicine f/u in one week.\par \par preparation, visit  coordination of care took 30 min.

## 2020-04-28 NOTE — PHYSICAL EXAM
[General Appearance - Well Developed] : well developed [General Appearance - Well Nourished] : well nourished [Normal Appearance] : normal appearance [Well Groomed] : well groomed [General Appearance - In No Acute Distress] : no acute distress [Skin Color & Pigmentation] : normal skin color and pigmentation [] : no respiratory distress [Respiration, Rhythm And Depth] : normal respiratory rhythm and effort [Exaggerated Use Of Accessory Muscles For Inspiration] : no accessory muscle use [Oriented To Time, Place, And Person] : oriented to person, place, and time [Mood] : the mood was normal [Affect] : the affect was normal [Not Anxious] : not anxious

## 2020-04-28 NOTE — REVIEW OF SYSTEMS
[Dysuria] : dysuria [Feeling Tired] : not feeling tired [Fever] : no fever [Chest Pain] : no chest pain [Constipation] : no constipation [Anxiety] : no anxiety [Depression] : no depression

## 2020-04-30 ENCOUNTER — NON-APPOINTMENT (OUTPATIENT)
Age: 45
End: 2020-04-30

## 2020-05-04 ENCOUNTER — RECORD ABSTRACTING (OUTPATIENT)
Age: 45
End: 2020-05-04

## 2020-05-20 ENCOUNTER — APPOINTMENT (OUTPATIENT)
Dept: UROLOGY | Facility: CLINIC | Age: 45
End: 2020-05-20
Payer: COMMERCIAL

## 2020-05-20 VITALS — TEMPERATURE: 98.1 F

## 2020-05-20 DIAGNOSIS — R12 HEARTBURN: ICD-10-CM

## 2020-05-20 PROCEDURE — 99214 OFFICE O/P EST MOD 30 MIN: CPT

## 2020-05-22 LAB
APPEARANCE: CLEAR
BACTERIA UR CULT: NORMAL
BACTERIA: NEGATIVE
BILIRUBIN URINE: NEGATIVE
BLOOD URINE: NEGATIVE
COLOR: NORMAL
GLUCOSE QUALITATIVE U: NEGATIVE
HYALINE CASTS: 1 /LPF
KETONES URINE: NEGATIVE
LEUKOCYTE ESTERASE URINE: NEGATIVE
MICROSCOPIC-UA: NORMAL
NITRITE URINE: NEGATIVE
PH URINE: 7.5
PROTEIN URINE: NEGATIVE
RED BLOOD CELLS URINE: 6 /HPF
SPECIFIC GRAVITY URINE: 1.01
SQUAMOUS EPITHELIAL CELLS: 1 /HPF
URINE CYTOLOGY: NORMAL
UROBILINOGEN URINE: NORMAL
WHITE BLOOD CELLS URINE: 0 /HPF

## 2020-06-07 LAB
APPEARANCE: CLEAR
BACTERIA: NEGATIVE
BILIRUBIN URINE: NEGATIVE
BLOOD URINE: NORMAL
COLOR: NORMAL
GLUCOSE QUALITATIVE U: NEGATIVE
HYALINE CASTS: 1 /LPF
KETONES URINE: NEGATIVE
LEUKOCYTE ESTERASE URINE: NEGATIVE
MICROSCOPIC-UA: NORMAL
NITRITE URINE: NEGATIVE
PH URINE: 6.5
PROTEIN URINE: NEGATIVE
RED BLOOD CELLS URINE: 2 /HPF
SPECIFIC GRAVITY URINE: 1.02
SQUAMOUS EPITHELIAL CELLS: 8 /HPF
URINE CYTOLOGY: NORMAL
UROBILINOGEN URINE: NORMAL
WHITE BLOOD CELLS URINE: 2 /HPF

## 2020-06-11 ENCOUNTER — RX RENEWAL (OUTPATIENT)
Age: 45
End: 2020-06-11

## 2020-06-12 ENCOUNTER — APPOINTMENT (OUTPATIENT)
Dept: UROLOGY | Facility: CLINIC | Age: 45
End: 2020-06-12
Payer: COMMERCIAL

## 2020-06-12 PROCEDURE — 99442: CPT

## 2020-06-12 NOTE — ASSESSMENT
[FreeTextEntry1] : Pt contacted at [phone]. Gave permission to conduct a Telephone visit. \par \par Mrs Thomas is a 44 year old female presented for follow of incomplete bladder emptying and UTI. The patient takes tamsulosin 0.4 mg one half hour after meal twice daily. The patient continued to take nitrofurantoin macrocrystal 100 mg, just prior to or after sexual intercourse to prevent UTI. She reported she has not had any recent UTI. The patient has not had any gross hematuria. She reported to having rare episodes of dysuria. The patient wakes 1-2 times nightly to urinate. She reported that she is emptying better now with medication than in the past. \par 9/13/18: The patient returned today and reported she had an UTI recently and was prescribed levofloxacin. Complained of pelvic pain when sitting for long periods of time. Complained of pain inside the vagina, feels like 'the feeling you get right before you push a baby out during labor.' Denied vaginal discharge. Noted her UTIs may no be related to sex. Takes nitrofurantoin before sex. Currently taking tamsulosin twice daily. Denied chance of pregnancy. \par 10/01/18: The patient returned and reported her dysuria is resolving. Complained of mild headaches after starting Doxycycline. Had 3 days left for treatment. Denied vaginal itching. \par 4/2/19: The patient returned and reported that she has had dysuria. Noted that a urine culture on 3/20/19 was negative. Has light discharge. Started taking AZO one day ago, which hasn't helped her burning. Currently taking Tamsulosin. Patient denied gross hematuria, urgency, or incontinence. 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 Noted that after the surgery, the patient had a UTI. Patient took Amoxicillin and three days worth of Fluconazole. Patient's dysuria started after the surgery, but still didn't resolve after finishing her trial of antibiotics.\par 4/16/19: The patient returned and reported that her symptoms haven't improved since taking Fluconazole. Finished the trial four days ago. The burning occurs after urination. Denied burning during urination. Takes Tamsulosin BID. Complained of lower lumbar pain bilaterally especially paraspinous, but no spine pain. Denied tobacco use.\par 4/30/19: The patient returned today and reported that taking Oxybutynin ER 5 mg has improved the burning after urination by 70%. The pain used to be a severity of 10/10 and now it is 3/10. Has slight dry mouth. Denies constipation. Also taking Tamsulosin BID.\par 5/21/19: Female patient presents today for a follow up. Since her last visit she reports that her current medical regimen, which includes Tamsulosin 0.4 mg and Oxybutynin 10 mg continue to provide relief from her urinary symptoms. On occasion she still notes that there is a slight uncomfortable feeling during urination. Dry mouth is reported as a result of taking Oxybutynin and notes that it was less on the lower dose. She denies hematuria, urgency, burning during urination, and frequency. She wakes up once a night to urinate. Occasional intermittency is reported. \par 9/26/19: Patient presents today for a follow up. Today she states that a UTI occurred since her last visit. She had Nitrofurantoin at home and began taking one tablet BID once the symptoms began. She took it at that dose for 5 days and no relief was experienced. On the sixth day she began taking two tablets BID and that dosage relieved her symptoms. She called and spoke with a nurse to see if that dosage was a proper dose but does not feel as if her questions were understood. Oxybutynin is continued as directed with benefit. The patient produced a urine sample which will be sent for urinalysis, urine cytology and urine culture. I have advised that she call back in 3 days to discuss the results of the urine culture. The prescribed antibiotic will be changed appropriately. No changes are made to her current medical regimen. She is to continue taking Oxybutynin and Tamsulosin as directed. If her symptoms resolve she can follow up in 3 months. If she continues to be symptomatic than I have advised that she return in 3 weeks for another evaluation. \par \par 06/12/2020: Pt contacted at [phone]. Gave permission to conduct a Telephone visit. Pt's pain persists. Fluconazole 100mg is working, but does not always. PT took Desipramine, but has stopped it. She has severe lower back pain. Urine on 06/01/2020 showed no abnormal results. She is experiencing vaginal swelling. Has had a partial hysterectomy. Ovaries are still intact. \par \par Advised to take 2 tablets of Desipramine qhs. Continue Tamsulosin and finish off Fluconazole. \par \par Pt will come for an office visit in 3 weeks. \par \par Preparation, visit and coordination of care took 11 minutes.

## 2020-06-12 NOTE — PHYSICAL EXAM
[General Appearance - Well Nourished] : well nourished [General Appearance - Well Developed] : well developed [General Appearance - In No Acute Distress] : no acute distress [Well Groomed] : well groomed [Normal Appearance] : normal appearance [Skin Color & Pigmentation] : normal skin color and pigmentation [] : no respiratory distress [Respiration, Rhythm And Depth] : normal respiratory rhythm and effort [Exaggerated Use Of Accessory Muscles For Inspiration] : no accessory muscle use [Oriented To Time, Place, And Person] : oriented to person, place, and time [Affect] : the affect was normal [Mood] : the mood was normal [Not Anxious] : not anxious [Abdomen Tenderness] : non-tender [Abdomen Soft] : soft [Heart Rate And Rhythm] : Heart rate and rhythm were normal [No Focal Deficits] : no focal deficits [Normal Station and Gait] : the gait and station were normal for the patient's age

## 2020-06-12 NOTE — HISTORY OF PRESENT ILLNESS
[FreeTextEntry1] : Mrs Charles is a 44 year old female presented for follow of incomplete bladder emptying and UTI. The patient takes tamsulosin 0.4 mg one half hour after meal twice daily. The patient continued to take nitrofurantoin macrocrystal 100 mg, just prior to or after sexual intercourse to prevent UTI. She reported she has not had any recent UTI. The patient has not had any gross hematuria. She reported to having rare episodes of dysuria. The patient wakes 1-2 times nightly to urinate. She reported that she is emptying better now with medication than in the past. \par 9/13/18: The patient returned today and reported she had an UTI recently and was prescribed levofloxacin. Complained of pelvic pain when sitting for long periods of time. Complained of pain inside the vagina, feels like 'the feeling you get right before you push a baby out during labor.' Denied vaginal discharge. Noted her UTIs may no be related to sex. Takes nitrofurantoin before sex. Currently taking tamsulosin twice daily. Denied chance of pregnancy. \par 10/01/18: The patient returned and reported her dysuria is resolving. Complained of mild headaches after starting Doxycycline. Had 3 days left for treatment. Denied vaginal itching. \par 4/2/19: The patient returned and reported that she has had dysuria. Noted that a urine culture on 3/20/19 was negative. Has light discharge. Started taking AZO one day ago, which hasn't helped her burning. Currently taking Tamsulosin. Patient denied gross hematuria, urgency, or incontinence. 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 Noted that after the surgery, the patient had a UTI. Patient took Amoxicillin and three days worth of Fluconazole. Patient's dysuria started after the surgery, but still didn't resolve after finishing her trial of antibiotics.\par 4/16/19: The patient returned and reported that her symptoms haven't improved since taking Fluconazole. Finished the trial four days ago. The burning occurs after urination. Denied burning during urination. Takes Tamsulosin BID. Complained of lower lumbar pain bilaterally especially paraspinous, but no spine pain. Denied tobacco use.\par 4/30/19: The patient returned today and reported that taking Oxybutynin ER 5 mg has improved the burning after urination by 70%. The pain used to be a severity of 10/10 and now it is 3/10. Has slight dry mouth. Denies constipation. Also taking Tamsulosin BID.\par 5/21/19: Female patient presents today for a follow up. Since her last visit she reports that her current medical regimen, which includes Tamsulosin 0.4 mg and Oxybutynin 10 mg continue to provide relief from her urinary symptoms. On occasion she still notes that there is a slight uncomfortable feeling during urination. Dry mouth is reported as a result of taking Oxybutynin and notes that it was less on the lower dose. She denies hematuria, urgency, burning during urination, and frequency. She wakes up once a night to urinate. Occasional intermittency is reported. \par 9/26/19: Patient presents today for a follow up. Today she states that a UTI occurred since her last visit. She had Nitrofurantoin at home and began taking one tablet BID once the symptoms began. She took it at that dose for 5 days and no relief was experienced. On the sixth day she began taking two tablets BID and that dosage relieved her symptoms. She called and spoke with a nurse to see if that dosage was a proper dose but does not feel as if her questions were understood. Oxybutynin is continued as directed with benefit. \par 04/28/2020: This visit was provided via telehealth using real-time 2-way audio visual technology. The patient, JA CHARLES, was located at patient appointment confirmed 10:00 am 4/28/202 - patient prefers cell phone: [phone] [email or web address] - email: [email or web address] , at the time of the visit. The patient, JA CHARLES and Provider participated in the telehealth encounter. \par \par 05/20/2020: Patient presents today for a follow up. Stopped Desipramine. Still has vaginal pain and discomfort. As well as vaginal and vulvar swelling. UA from 04/25/2020 was normal.

## 2020-06-12 NOTE — HISTORY OF PRESENT ILLNESS
[FreeTextEntry1] : 06/12/2020: Pt contacted at [phone]. Gave permission to conduct a Telephone visit. \par \par Mrs Thomas is a 44 year old female presented for follow of incomplete bladder emptying and UTI. The patient takes tamsulosin 0.4 mg one half hour after meal twice daily. The patient continued to take nitrofurantoin macrocrystal 100 mg, just prior to or after sexual intercourse to prevent UTI. She reported she has not had any recent UTI. The patient has not had any gross hematuria. She reported to having rare episodes of dysuria. The patient wakes 1-2 times nightly to urinate. She reported that she is emptying better now with medication than in the past. \par 9/13/18: The patient returned today and reported she had an UTI recently and was prescribed levofloxacin. Complained of pelvic pain when sitting for long periods of time. Complained of pain inside the vagina, feels like 'the feeling you get right before you push a baby out during labor.' Denied vaginal discharge. Noted her UTIs may no be related to sex. Takes nitrofurantoin before sex. Currently taking tamsulosin twice daily. Denied chance of pregnancy. \par 10/01/18: The patient returned and reported her dysuria is resolving. Complained of mild headaches after starting Doxycycline. Had 3 days left for treatment. Denied vaginal itching. \par 4/2/19: The patient returned and reported that she has had dysuria. Noted that a urine culture on 3/20/19 was negative. Has light discharge. Started taking AZO one day ago, which hasn't helped her burning. Currently taking Tamsulosin. Patient denied gross hematuria, urgency, or incontinence. 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 Noted that after the surgery, the patient had a UTI. Patient took Amoxicillin and three days worth of Fluconazole. Patient's dysuria started after the surgery, but still didn't resolve after finishing her trial of antibiotics.\par 4/16/19: The patient returned and reported that her symptoms haven't improved since taking Fluconazole. Finished the trial four days ago. The burning occurs after urination. Denied burning during urination. Takes Tamsulosin BID. Complained of lower lumbar pain bilaterally especially paraspinous, but no spine pain. Denied tobacco use.\par 4/30/19: The patient returned today and reported that taking Oxybutynin ER 5 mg has improved the burning after urination by 70%. The pain used to be a severity of 10/10 and now it is 3/10. Has slight dry mouth. Denies constipation. Also taking Tamsulosin BID.\par 5/21/19: Female patient presents today for a follow up. Since her last visit she reports that her current medical regimen, which includes Tamsulosin 0.4 mg and Oxybutynin 10 mg continue to provide relief from her urinary symptoms. On occasion she still notes that there is a slight uncomfortable feeling during urination. Dry mouth is reported as a result of taking Oxybutynin and notes that it was less on the lower dose. She denies hematuria, urgency, burning during urination, and frequency. She wakes up once a night to urinate. Occasional intermittency is reported. \par 9/26/19: Patient presents today for a follow up. Today she states that a UTI occurred since her last visit. She had Nitrofurantoin at home and began taking one tablet BID once the symptoms began. She took it at that dose for 5 days and no relief was experienced. On the sixth day she began taking two tablets BID and that dosage relieved her symptoms. She called and spoke with a nurse to see if that dosage was a proper dose but does not feel as if her questions were understood. Oxybutynin is continued as directed with benefit. \par \par 06/12/2020: Pt contacted at [phone]. Gave permission to conduct a Telephone visit. Pt's pain persists. Fluconazole 100mg is working, but does not always. PT took Desipramine, but has stopped it. She has severe lower back pain. Urine on 06/01/2020 showed no abnormal results. She is experiencing vaginal swelling. Has had a partial hysterectomy. Ovaries are still intact.

## 2020-06-12 NOTE — ASSESSMENT
[FreeTextEntry1] : Mrs Thomas is a 44 year old female presented for follow of incomplete bladder emptying and UTI. The patient takes tamsulosin 0.4 mg one half hour after meal twice daily. The patient continued to take nitrofurantoin macrocrystal 100 mg, just prior to or after sexual intercourse to prevent UTI. She reported she has not had any recent UTI. The patient has not had any gross hematuria. She reported to having rare episodes of dysuria. The patient wakes 1-2 times nightly to urinate. She reported that she is emptying better now with medication than in the past. \par 9/13/18: The patient returned today and reported she had an UTI recently and was prescribed levofloxacin. Complained of pelvic pain when sitting for long periods of time. Complained of pain inside the vagina, feels like 'the feeling you get right before you push a baby out during labor.' Denied vaginal discharge. Noted her UTIs may no be related to sex. Takes nitrofurantoin before sex. Currently taking tamsulosin twice daily. Denied chance of pregnancy. \par 10/01/18: The patient returned and reported her dysuria is resolving. Complained of mild headaches after starting Doxycycline. Had 3 days left for treatment. Denied vaginal itching. \par 4/2/19: The patient returned and reported that she has had dysuria. Noted that a urine culture on 3/20/19 was negative. Has light discharge. Started taking AZO one day ago, which hasn't helped her burning. Currently taking Tamsulosin. Patient denied gross hematuria, urgency, or incontinence. 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 Noted that after the surgery, the patient had a UTI. Patient took Amoxicillin and three days worth of Fluconazole. Patient's dysuria started after the surgery, but still didn't resolve after finishing her trial of antibiotics.\par 4/16/19: The patient returned and reported that her symptoms haven't improved since taking Fluconazole. Finished the trial four days ago. The burning occurs after urination. Denied burning during urination. Takes Tamsulosin BID. Complained of lower lumbar pain bilaterally especially paraspinous, but no spine pain. Denied tobacco use.\par 4/30/19: The patient returned today and reported that taking Oxybutynin ER 5 mg has improved the burning after urination by 70%. The pain used to be a severity of 10/10 and now it is 3/10. Has slight dry mouth. Denies constipation. Also taking Tamsulosin BID.\par 5/21/19: Female patient presents today for a follow up. Since her last visit she reports that her current medical regimen, which includes Tamsulosin 0.4 mg and Oxybutynin 10 mg continue to provide relief from her urinary symptoms. On occasion she still notes that there is a slight uncomfortable feeling during urination. Dry mouth is reported as a result of taking Oxybutynin and notes that it was less on the lower dose. She denies hematuria, urgency, burning during urination, and frequency. She wakes up once a night to urinate. Occasional intermittency is reported. \par 9/26/19: Patient presents today for a follow up. Today she states that a UTI occurred since her last visit. She had Nitrofurantoin at home and began taking one tablet BID once the symptoms began. She took it at that dose for 5 days and no relief was experienced. On the sixth day she began taking two tablets BID and that dosage relieved her symptoms. She called and spoke with a nurse to see if that dosage was a proper dose but does not feel as if her questions were understood. Oxybutynin is continued as directed with benefit. The patient produced a urine sample which will be sent for urinalysis, urine cytology and urine culture. I have advised that she call back in 3 days to discuss the results of the urine culture. The prescribed antibiotic will be changed appropriately. No changes are made to her current medical regimen. She is to continue taking Oxybutynin and Tamsulosin as directed. If her symptoms resolve she can follow up in 3 months. If she continues to be symptomatic than I have advised that she return in 3 weeks for another evaluation. \par 04/28/2020: Patient presents today for a follow up. patient with urinary frequency and incomplete bladder emptying managed with oxybutynin ER 10 mg and tamsulosin o.4 mg 2x/d 1/2 hour after meals.patient was doing well so six months ago stopped oxybutynin and continued to do well. recently about a week or two ago patient experienced dysuria and continued pain for awhile after conclusion of micturition. and radiated into lower abdomen. Patient resumed oxybutynin and experienced mild improvement. Patient denies constipation, chest pain, urinary urgency. She has had one episode of slight urinary stress leakage with a forceful sneeze. I will prescribe desipramine 10 mg at bedtime. Telemedicine f/u in one week. preparation, visit coordination of care took 30 min. \par \par 05/20/2020: Patient presents today for a follow up. Stopped Desipramine. Still has vaginal pain and discomfort. As well as vaginal and vulvar swelling. UA from 04/25/2020 was normal. Pt will provide a urine sample today for culture, cytology and analysis. 6 RBC. Repeat at future date. \par \par Started on Fluconazole 100mg daily for possibly of candida vaginitis.

## 2020-06-12 NOTE — ADDENDUM
[FreeTextEntry1] : This note was authored by Laura Lopez working as scribe for Dr. Danny Baker. I, Dr. Danny Baker, have reviewed the content of this note and confirm it is true and accurate. I personally performed the history and physical examination and made all the decisions.\par 06/12/2020

## 2020-06-12 NOTE — REVIEW OF SYSTEMS
[Dysuria] : dysuria [Fever] : no fever [Feeling Tired] : not feeling tired [Chest Pain] : no chest pain [Constipation] : no constipation [Depression] : no depression [Anxiety] : no anxiety

## 2020-07-07 ENCOUNTER — APPOINTMENT (OUTPATIENT)
Dept: UROLOGY | Facility: CLINIC | Age: 45
End: 2020-07-07
Payer: COMMERCIAL

## 2020-07-07 VITALS — TEMPERATURE: 97 F

## 2020-07-07 LAB
APPEARANCE: CLEAR
BILIRUBIN URINE: NEGATIVE
BLOOD URINE: NEGATIVE
COLOR: NORMAL
GLUCOSE QUALITATIVE U: NEGATIVE
KETONES URINE: NEGATIVE
LEUKOCYTE ESTERASE URINE: NEGATIVE
NITRITE URINE: NEGATIVE
PH URINE: 7
PROTEIN URINE: NEGATIVE
SPECIFIC GRAVITY URINE: 1.02
UROBILINOGEN URINE: NORMAL

## 2020-07-07 PROCEDURE — 99214 OFFICE O/P EST MOD 30 MIN: CPT

## 2020-07-07 RX ORDER — DESIPRAMINE 10 MG/1
10 TABLET, FILM COATED ORAL
Qty: 60 | Refills: 11 | Status: COMPLETED | COMMUNITY
Start: 2020-04-28 | End: 2020-07-07

## 2020-07-07 NOTE — ASSESSMENT
[FreeTextEntry1] : Mrs Thomas is a 44 year old female presented for follow of incomplete bladder emptying and UTI. The patient takes tamsulosin 0.4 mg one half hour after meal twice daily. The patient continued to take nitrofurantoin macrocrystal 100 mg, just prior to or after sexual intercourse to prevent UTI. She reported she has not had any recent UTI. The patient has not had any gross hematuria. She reported to having rare episodes of dysuria. The patient wakes 1-2 times nightly to urinate. She reported that she is emptying better now with medication than in the past. \par 9/13/18: The patient returned today and reported she had an UTI recently and was prescribed levofloxacin. Complained of pelvic pain when sitting for long periods of time. Complained of pain inside the vagina, feels like 'the feeling you get right before you push a baby out during labor.' Denied vaginal discharge. Noted her UTIs may no be related to sex. Takes nitrofurantoin before sex. Currently taking tamsulosin twice daily. Denied chance of pregnancy. \par 10/01/18: The patient returned and reported her dysuria is resolving. Complained of mild headaches after starting Doxycycline. Had 3 days left for treatment. Denied vaginal itching. \par 4/2/19: The patient returned and reported that she has had dysuria. Noted that a urine culture on 3/20/19 was negative. Has light discharge. Started taking AZO one day ago, which hasn't helped her burning. Currently taking Tamsulosin. Patient denied gross hematuria, urgency, or incontinence. 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 Noted that after the surgery, the patient had a UTI. Patient took Amoxicillin and three days worth of Fluconazole. Patient's dysuria started after the surgery, but still didn't resolve after finishing her trial of antibiotics.\par 4/16/19: The patient returned and reported that her symptoms haven't improved since taking Fluconazole. Finished the trial four days ago. The burning occurs after urination. Denied burning during urination. Takes Tamsulosin BID. Complained of lower lumbar pain bilaterally especially paraspinous, but no spine pain. Denied tobacco use.\par 4/30/19: The patient returned today and reported that taking Oxybutynin ER 5 mg has improved the burning after urination by 70%. The pain used to be a severity of 10/10 and now it is 3/10. Has slight dry mouth. Denies constipation. Also taking Tamsulosin BID.\par 5/21/19: Female patient presents today for a follow up. Since her last visit she reports that her current medical regimen, which includes Tamsulosin 0.4 mg and Oxybutynin 10 mg continue to provide relief from her urinary symptoms. On occasion she still notes that there is a slight uncomfortable feeling during urination. Dry mouth is reported as a result of taking Oxybutynin and notes that it was less on the lower dose. She denies hematuria, urgency, burning during urination, and frequency. She wakes up once a night to urinate. Occasional intermittency is reported. \par 9/26/19: Patient presents today for a follow up. Today she states that a UTI occurred since her last visit. She had Nitrofurantoin at home and began taking one tablet BID once the symptoms began. She took it at that dose for 5 days and no relief was experienced. On the sixth day she began taking two tablets BID and that dosage relieved her symptoms. She called and spoke with a nurse to see if that dosage was a proper dose but does not feel as if her questions were understood. Oxybutynin is continued as directed with benefit. The patient produced a urine sample which will be sent for urinalysis, urine cytology and urine culture. I have advised that she call back in 3 days to discuss the results of the urine culture. The prescribed antibiotic will be changed appropriately. No changes are made to her current medical regimen. She is to continue taking Oxybutynin and Tamsulosin as directed. If her symptoms resolve she can follow up in 3 months. If she continues to be symptomatic than I have advised that she return in 3 weeks for another evaluation. \par 06/12/2020: Pt contacted at [phone]. Gave permission to conduct a Telephone visit. Pt's pain persists. Fluconazole 100mg is working, but does not always. PT took Desipramine, but has stopped it. She has severe lower back pain. Urine on 06/01/2020 showed no abnormal results. She is experiencing vaginal swelling. Has had a partial hysterectomy. Ovaries are still intact. Advised to take 2 tablets of Desipramine qhs. Continue Tamsulosin and finish off Fluconazole. Pt will come for an office visit in 3 weeks. Preparation, visit and coordination of care took 11 minutes. \par \par 07/07/2020: Patient presents today for a follow up. Pt did well on Fluconazole. Pt had more burning with Desipramine. She has some stress incontinence and she is now wearing pads. Pt gets left sided flank pain. There is no additional pain with pressure on the area. Pt has disc herniation on the lower left side. Pt is still on Tamsulosin once daily after dinner. She feel she's emptying fine, but there is discomfort and leaking afterwards. Pt is on Oxybutynin every morning. \par \par Prescribed Duloxetine 20mg. \par \par Pt will provide a urine sample today for culture, cytology and analysis. \par \par Pt will call in 2 weeks to discuss the effect of her medications.

## 2020-07-07 NOTE — HISTORY OF PRESENT ILLNESS
[FreeTextEntry1] : Mrs Thomas is a 44 year old female presented for follow of incomplete bladder emptying and UTI. The patient takes tamsulosin 0.4 mg one half hour after meal twice daily. The patient continued to take nitrofurantoin macrocrystal 100 mg, just prior to or after sexual intercourse to prevent UTI. She reported she has not had any recent UTI. The patient has not had any gross hematuria. She reported to having rare episodes of dysuria. The patient wakes 1-2 times nightly to urinate. She reported that she is emptying better now with medication than in the past. \par 9/13/18: The patient returned today and reported she had an UTI recently and was prescribed levofloxacin. Complained of pelvic pain when sitting for long periods of time. Complained of pain inside the vagina, feels like 'the feeling you get right before you push a baby out during labor.' Denied vaginal discharge. Noted her UTIs may no be related to sex. Takes nitrofurantoin before sex. Currently taking tamsulosin twice daily. Denied chance of pregnancy. \par 10/01/18: The patient returned and reported her dysuria is resolving. Complained of mild headaches after starting Doxycycline. Had 3 days left for treatment. Denied vaginal itching. \par 4/2/19: The patient returned and reported that she has had dysuria. Noted that a urine culture on 3/20/19 was negative. Has light discharge. Started taking AZO one day ago, which hasn't helped her burning. Currently taking Tamsulosin. Patient denied gross hematuria, urgency, or incontinence. 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 Noted that after the surgery, the patient had a UTI. Patient took Amoxicillin and three days worth of Fluconazole. Patient's dysuria started after the surgery, but still didn't resolve after finishing her trial of antibiotics.\par 4/16/19: The patient returned and reported that her symptoms haven't improved since taking Fluconazole. Finished the trial four days ago. The burning occurs after urination. Denied burning during urination. Takes Tamsulosin BID. Complained of lower lumbar pain bilaterally especially paraspinous, but no spine pain. Denied tobacco use.\par 4/30/19: The patient returned today and reported that taking Oxybutynin ER 5 mg has improved the burning after urination by 70%. The pain used to be a severity of 10/10 and now it is 3/10. Has slight dry mouth. Denies constipation. Also taking Tamsulosin BID.\par 5/21/19: Female patient presents today for a follow up. Since her last visit she reports that her current medical regimen, which includes Tamsulosin 0.4 mg and Oxybutynin 10 mg continue to provide relief from her urinary symptoms. On occasion she still notes that there is a slight uncomfortable feeling during urination. Dry mouth is reported as a result of taking Oxybutynin and notes that it was less on the lower dose. She denies hematuria, urgency, burning during urination, and frequency. She wakes up once a night to urinate. Occasional intermittency is reported. \par 9/26/19: Patient presents today for a follow up. Today she states that a UTI occurred since her last visit. She had Nitrofurantoin at home and began taking one tablet BID once the symptoms began. She took it at that dose for 5 days and no relief was experienced. On the sixth day she began taking two tablets BID and that dosage relieved her symptoms. She called and spoke with a nurse to see if that dosage was a proper dose but does not feel as if her questions were understood. Oxybutynin is continued as directed with benefit. \par 06/12/2020: Pt contacted at [phone]. Gave permission to conduct a Telephone visit. Pt's pain persists. Fluconazole 100mg is working, but does not always. PT took Desipramine, but has stopped it. She has severe lower back pain. Urine on 06/01/2020 showed no abnormal results. She is experiencing vaginal swelling. Has had a partial hysterectomy. Ovaries are still intact.\par \par 07/07/2020: Patient presents today for a follow up. Pt did well on Fluconazole. Pt had more burning with Desipramine. She has some stress incontinence and she is now wearing pads. Pt gets left sided flank pain. There is no additional pain with pressure on the area. Pt has disc herniation on the lower left side. Pt is still on Tamsulosin once daily after dinner. She feel she's emptying fine, but there is discomfort and leaking afterwards. Pt is on Oxybutynin every morning.

## 2020-07-07 NOTE — PHYSICAL EXAM
[General Appearance - Well Developed] : well developed [General Appearance - Well Nourished] : well nourished [Normal Appearance] : normal appearance [Well Groomed] : well groomed [Abdomen Tenderness] : non-tender [Abdomen Soft] : soft [General Appearance - In No Acute Distress] : no acute distress [Costovertebral Angle Tenderness] : no ~M costovertebral angle tenderness [Urinary Bladder Findings] : the bladder was normal on palpation [Edema] : no peripheral edema [Respiration, Rhythm And Depth] : normal respiratory rhythm and effort [] : no respiratory distress [Exaggerated Use Of Accessory Muscles For Inspiration] : no accessory muscle use [Oriented To Time, Place, And Person] : oriented to person, place, and time [Affect] : the affect was normal [Mood] : the mood was normal [Not Anxious] : not anxious [Normal Station and Gait] : the gait and station were normal for the patient's age [No Focal Deficits] : no focal deficits [No Palpable Adenopathy] : no palpable adenopathy

## 2020-07-07 NOTE — ADDENDUM
[FreeTextEntry1] : This note was authored by Laura Lopez working as scribe for Dr. Danny Baker. I, Dr. Danny Baker, have reviewed the content of this note and confirm it is true and accurate. I personally performed the history and physical examination and made all the decisions.\par 07/07/2020

## 2020-07-08 LAB — URINE CYTOLOGY: NORMAL

## 2021-06-05 NOTE — H&P PST ADULT - PRO ARRIVE FROM
[facility]  Progress Note - Queenie Pierson 1955, 77 y o  male MRN: [id number]  Unit/Bed#: E5 -01 Encounter: [id number]  Primary Care Provider: Gary Cline MD   Date and time admitted to hospital: 6/2/2021  3:12 PM    * Systemic inflammatory response syndrome (SIRS) due to noninfectious process [facility])  Assessment & Plan  · Likely SIRS from B symptoms due to lymphoma  · However, noted to have elevated lactic acid together with fever and tachycardia on admission  · Patient remains a tachycardia of 120, febrile 100 4 and elevated lactic acid  · Urine culture without acute infection  · Chest x-ray no focal opacity to suggest pneumonia  Mild bilateral hilar fullness suspected left greater than right suspecting underlying hilar lymphadenopathy  · Noted to have elevated procalcitonin on admission of 0 63, repeat 8 38, 29 79, repeat pending  · Blood cultures negative at 24 hrs  · Started on empiric cefepime-will continue at this time given elevated procal  · Will have Infectious Disease see in consultation, appreciate recommendations  · Infectious disease suspects this is likely secondary to stage IV Hodgkin lymphoma with metastasis, not infectious however continue antibiotics given elevated procalcitonin  · Patient admitted to [facility] approximately a week and half ago for similar symptoms  · CT Chest, abdomen , pelvis with contrast--> evidence multifocal malignancy in the chest, abdomen, and pelvis involving lung parenchyma, lymph nodes, liver, and spleen  Subsegmental right lower lobe PE  Small pleural effusions  · Consult heme/Onc    Hypokalemia  Assessment & Plan  · Potassium 3 2   · Oral replacement   · BMP in a m      Single subsegmental pulmonary embolism without acute cor pulmonale (HCC)  Assessment & Plan  · CT scan performed in 6/4 revealed subsegmental pulmonary embolism in right lower lobe  · Heparin drip initiated  · Continue to monitor  · Will transition to Eliquis 10 b i d  x7 days followed by 5 BID when clinically appropriate     Hypotension  Assessment & Plan  · Patient has remained hypotensive throughout admission  · Most recent 83/53  · Midodrine 15mg TID  · Patient unable to tolerate fluid bolus on 06/03 secondary to respiratory distress  · Retried fluids--> S/p 1 liter bolus on 6/4, 500ml bolus 6/5 without difficulty  · Critical care consulted    Severe protein-calorie malnutrition ([facility] [address] )  Assessment & Plan  Malnutrition Findings:   Adult Malnutrition type: Chronic illness  Adult Degree of Malnutrition: Other severe protein calorie malnutrition    BMI Findings:  Adult BMI Classifications: Underweight < 18 5     There is no height or weight on file to calculate BMI  · In the setting of lymphoma, as evidenced by muscle wasting, fat loss, inadequate energy intake  · Requires nutritional supplements and dietitian consult    Hyponatremia  Assessment & Plan  · Mild likely due to underlying malignancy  · Recheck sodium with improvement  · Most recent sodium 135  · BMP in a m  Anemia  Assessment & Plan  · Likely secondary to lymphoma  · He has no jaundice/elevated LFTs to suggest hemolysis  · He has no sign of bleeding  · Hemoglobin on admission 6 3   · Status post 1 unit of PRBCs on 06/02 with rise in hemoglobin to 7 2   · Hemoglobin remained stable most recent 7 8  · Continue to monitor as patient may need another transfusion  · A m  Lab work    Hodgkin lymphoma of intra-abdominal lymph nodes ([facility] [address] )  Assessment & Plan  · Stage IV B  · Initial diagnosis in 2018, initially denied treatment secondary to Sabianist beliefs  · Known to hematologist/oncologist Dr Noelle Canas, at [facility]  · According to his wife and [facility] records he will need immunotherapy , however this has not been started due to insurance issues    · Patient refused chemotherapy in the past, was previously treated with Nivolumab  · Patient was last evaluated by Dr Noelle Canas on 5/4 and home agreed to restarting Nivolumab every 2 weeks, is scheduled to restart on   · CT Chest, abdomen , pelvis with contrast--> evidence multifocal malignancy in the chest, abdomen, and pelvis involving lung parenchyma, lymph nodes, liver, and spleen  Subsegmental right lower lobe PE  Small pleural effusions  · Consult heme/Onc      VTE Pharmacologic Prophylaxis:   Pharmacologic: Heparin Drip  Mechanical VTE Prophylaxis in Place: Yes    Patient Centered Rounds: I have performed bedside rounds with nursing staff today  Discussions with Specialists or Other Care Team Provider:  None    Education and Discussions with Family / Patient:  Discussed plan of care with patient and patient's wife at bedside using   Time Spent for Care: 20 minutes  More than 50% of total time spent on counseling and coordination of care as described above  Current Length of Stay: 3 day(s)    Current Patient Status: Inpatient   Certification Statement: The patient will continue to require additional inpatient hospital stay due to IV antibiotics, heparin drip    Discharge Plan: To home within 24-48 hours pending clinical improvement    Code Status: Level 1 - Full Code      Subjective: The patient states that he is feeling better than yesterday  States that he is less tired than he was yesterday  Denies any chest pain, shortness of breath, nausea, vomiting, diarrhea, constipation, headaches, vision changes  Objective:     Vitals:   Temp (24hrs), Av 1 °F (37 3 °C), Min:98 1 °F (36 7 °C), Max:102 4 °F (39 1 °C)    Temp:  [98 1 °F (36 7 °C)-102 4 °F (39 1 °C)] 98 3 °F (36 8 °C)  HR:  [104-118] 110  Resp:  [18-22] 18  BP: (83-95)/(53-65) 83/53  SpO2:  [88 %-95 %] 95 %  Body mass index is 16 59 kg/m²  Input and Output Summary (last 24 hours):        Intake/Output Summary (Last 24 hours) at 2021 1128  Last data filed at 2021 1700  Gross per 24 hour   Intake 60 ml   Output 1 ml   Net 59 ml       Physical Exam:     Physical Exam  Vitals signs and nursing note reviewed  Constitutional:       Appearance: He is well-developed  He is ill-appearing and diaphoretic  Comments: Cachectic, chronically ill-appearing   HENT:      Head: Normocephalic and atraumatic  Eyes:      General: No scleral icterus  Conjunctiva/sclera: Conjunctivae normal    Neck:      Musculoskeletal: Neck supple  Cardiovascular:      Rate and Rhythm: Regular rhythm  Tachycardia present  Heart sounds: No murmur  No gallop  Pulmonary:      Effort: Pulmonary effort is normal  No respiratory distress  Breath sounds: No stridor  Wheezing present  No rhonchi or rales  Comments: Diffuse wheezing  Chest:      Chest wall: No tenderness  Abdominal:      General: Abdomen is flat  Bowel sounds are normal  There is no distension  Palpations: Abdomen is soft  Tenderness: There is no abdominal tenderness  Musculoskeletal:      Right lower leg: No edema  Left lower leg: No edema  Skin:     General: Skin is warm  Capillary Refill: Capillary refill takes less than 2 seconds  Coloration: Skin is not jaundiced or pale  Findings: No erythema  Neurological:      General: No focal deficit present  Mental Status: He is alert and oriented to person, place, and time  Mental status is at baseline  Additional Data:     Labs:    Results from last 7 days   Lab Units 06/05/21  0534  06/03/21  0633   WBC Thousand/uL 5 22   < > 6 42   HEMOGLOBIN g/dL 7 8*   < > 7 2*   HEMATOCRIT % 26 4*   < > 23 8*   PLATELETS Thousands/uL 372   < > 414*   NEUTROS PCT %  --   --  85*   LYMPHS PCT %  --   --  5*   MONOS PCT %  --   --  7   EOS PCT %  --   --  2    < > = values in this interval not displayed       Results from last 7 days   Lab Units 06/05/21  0534 06/04/21  0501   SODIUM mmol/L 132* 135*   POTASSIUM mmol/L 3 2* 3 8   CHLORIDE mmol/L 92* 95*   CO2 mmol/L 30 30   BUN mg/dL 14 14   CREATININE mg/dL 0 56* 0 58*   ANION GAP mmol/L 10 10   CALCIUM mg/dL 8 6 8 7   ALBUMIN g/dL  --  1 4*   TOTAL BILIRUBIN mg/dL  --  0 61   ALK PHOS U/L  --  151*   ALT U/L  --  34   AST U/L  --  23   GLUCOSE RANDOM mg/dL 123 91     Results from last 7 days   Lab Units 06/04/21  1959   INR  1 79*             Results from last 7 days   Lab Units 06/04/21  0501 06/03/21  0633 06/02/21  1724 06/02/21  1527   LACTIC ACID mmol/L  --   --  1 6 3 4*   PROCALCITONIN ng/ml 29 79* 8 38*  --  0 63*           * I Have Reviewed All Lab Data Listed Above  * Additional Pertinent Lab Tests Reviewed: [facility]    Imaging:    Imaging Reports Reviewed Today Include:  CT chest, abdomen, pelvis  Imaging Personally Reviewed by Myself Includes:  See above    Recent Cultures (last 7 days):     Results from last 7 days   Lab Units 06/02/21  1527   BLOOD CULTURE  No Growth at 48 hrs  No Growth at 48 hrs  Last 24 Hours Medication List:   Current Facility-Administered Medications   Medication Dose Route Frequency Provider Last Rate    acetaminophen  650 mg Oral Q6H PRN Dov Rivera MD      cefepime  2,000 mg Intravenous Q12H Dov Rivera  mL/hr at 06/05/21 0630    heparin (porcine)  3-30 Units/kg/hr (Order-Specific) Intravenous Titrated Pily Alicea PA-C 20 Units/kg/hr (06/05/21 0711)    heparin (porcine)  2,200 Units Intravenous Q1H PRN Pily Alicea PA-C      heparin (porcine)  4,400 Units Intravenous Q1H PRN Pily Alicea PA-C      midodrine  15 mg Oral TID AC Pily Alicea PA-C      vancomycin  1,000 mg Intravenous Q12H Kathy Casey MD 1,000 mg (06/05/21 3983)        Today, Patient Was Seen By: Pily Alicea PA-C    ** Please Note: Dictation voice to text software may have been used in the creation of this document   **

## 2021-07-18 NOTE — REVIEW OF SYSTEMS
[Heartburn] : heartburn [Dysuria] : dysuria [Pelvic Pain] : pelvic pain [Negative] : Heme/Lymph [Feeling Poorly] : not feeling poorly [Sore Throat] : no sore throat [Feeling Tired] : not feeling tired [Eyesight Problems] : no eyesight problems [Chest Pain] : no chest pain [Vaginal Discharge] : no vaginal discharge [Constipation] : no constipation Yes

## 2021-09-28 NOTE — ED ADULT TRIAGE NOTE - SPO2 (%)
What Is The Reason For Today's Visit?: History of Melanoma
Breslow Depth?: 1.95
Year Excised?: 2009, 2017
98

## 2021-10-01 NOTE — PRE-ANESTHESIA EVALUATION ADULT - NSDENTALSD_ENT_ALL_CORE
Per previous encounters- patient taking for rosacea  New prescription sent    Karmen MADERA RN   [facility]    appears normal and intact appears normal and intact/caps/bridge/implants

## 2021-11-04 ENCOUNTER — APPOINTMENT (OUTPATIENT)
Dept: UROLOGY | Facility: CLINIC | Age: 46
End: 2021-11-04
Payer: COMMERCIAL

## 2021-11-04 PROCEDURE — 99214 OFFICE O/P EST MOD 30 MIN: CPT

## 2021-11-04 RX ORDER — NITROFURANTOIN (MONOHYDRATE/MACROCRYSTALS) 25; 75 MG/1; MG/1
100 CAPSULE ORAL
Qty: 20 | Refills: 0 | Status: ACTIVE | COMMUNITY
Start: 2021-11-04 | End: 1900-01-01

## 2021-11-04 RX ORDER — FLUCONAZOLE 100 MG/1
100 TABLET ORAL
Qty: 11 | Refills: 0 | Status: COMPLETED | COMMUNITY
Start: 2020-07-07 | End: 2021-11-04

## 2021-11-04 RX ORDER — DULOXETINE HYDROCHLORIDE 20 MG/1
20 CAPSULE, DELAYED RELEASE PELLETS ORAL DAILY
Qty: 30 | Refills: 11 | Status: COMPLETED | COMMUNITY
Start: 2020-07-07 | End: 2021-11-04

## 2021-11-04 RX ORDER — FLUCONAZOLE 100 MG/1
100 TABLET ORAL
Qty: 30 | Refills: 0 | Status: COMPLETED | COMMUNITY
Start: 2020-05-05 | End: 2021-11-04

## 2021-11-06 LAB
APPEARANCE: CLEAR
BACTERIA UR CULT: NORMAL
BACTERIA: NEGATIVE
BILIRUBIN URINE: NEGATIVE
BLOOD URINE: NORMAL
CALCIUM OXALATE CRYSTALS: ABNORMAL
COLOR: YELLOW
GLUCOSE QUALITATIVE U: NEGATIVE
HYALINE CASTS: 0 /LPF
KETONES URINE: NEGATIVE
LEUKOCYTE ESTERASE URINE: NEGATIVE
MICROSCOPIC-UA: NORMAL
NITRITE URINE: NEGATIVE
PH URINE: 6
PROTEIN URINE: NORMAL
RED BLOOD CELLS URINE: 2 /HPF
SPECIFIC GRAVITY URINE: 1.03
SQUAMOUS EPITHELIAL CELLS: 2 /HPF
URINE CYTOLOGY: NORMAL
UROBILINOGEN URINE: NORMAL
WHITE BLOOD CELLS URINE: 2 /HPF

## 2021-11-06 NOTE — ASSESSMENT
[FreeTextEntry1] : Mrs Thomas is a 46 year old female presented for follow of incomplete bladder emptying and UTI. The patient takes tamsulosin 0.4 mg one half hour after meal twice daily. The patient continued to take nitrofurantoin macrocrystal 100 mg, just prior to or after sexual intercourse to prevent UTI. She reported she has not had any recent UTI. The patient has not had any gross hematuria. She reported to having rare episodes of dysuria. The patient wakes 1-2 times nightly to urinate. She reported that she is emptying better now with medication than in the past. \par 9/13/18: The patient returned today and reported she had an UTI recently and was prescribed levofloxacin. Complained of pelvic pain when sitting for long periods of time. Complained of pain inside the vagina, feels like 'the feeling you get right before you push a baby out during labor.' Denied vaginal discharge. Noted her UTIs may no be related to sex. Takes nitrofurantoin before sex. Currently taking tamsulosin twice daily. Denied chance of pregnancy. \par 10/01/18: The patient returned and reported her dysuria is resolving. Complained of mild headaches after starting Doxycycline. Had 3 days left for treatment. Denied vaginal itching. \par \par 2/12/2019 Laparoscopic supracervical hysterectomy and bilateral salpingectomy and cystoscopy.\par \par 4/2/19: The patient returned and reported that she has had dysuria. Noted that a urine culture on 3/20/19 was negative. Has light discharge. Started taking AZO one day ago, which hasn't helped her burning. Currently taking Tamsulosin. Patient denied gross hematuria, urgency, or incontinence. 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 Noted that after the surgery, the patient had a UTI. Patient took Amoxicillin and three days worth of Fluconazole. Patient's dysuria started after the surgery, but still didn't resolve after finishing her trial of antibiotics.\par 4/16/19: The patient returned and reported that her symptoms haven't improved since taking Fluconazole. Finished the trial four days ago. The burning occurs after urination. Denied burning during urination. Takes Tamsulosin BID. Complained of lower lumbar pain bilaterally especially paraspinous, but no spine pain. Denied tobacco use.\par 4/30/19: The patient returned today and reported that taking Oxybutynin ER 5 mg has improved the burning after urination by 70%. The pain used to be a severity of 10/10 and now it is 3/10. Has slight dry mouth. Denies constipation. Also taking Tamsulosin BID.\par 5/21/19: Female patient presents today for a follow up. Since her last visit she reports that her current medical regimen, which includes Tamsulosin 0.4 mg and Oxybutynin 10 mg continue to provide relief from her urinary symptoms. On occasion she still notes that there is a slight uncomfortable feeling during urination. Dry mouth is reported as a result of taking Oxybutynin and notes that it was less on the lower dose. She denies hematuria, urgency, burning during urination, and frequency. She wakes up once a night to urinate. Occasional intermittency is reported. \par 9/26/19: Patient presents today for a follow up. Today she states that a UTI occurred since her last visit. She had Nitrofurantoin at home and began taking one tablet BID once the symptoms began. She took it at that dose for 5 days and no relief was experienced. On the sixth day she began taking two tablets BID and that dosage relieved her symptoms. She called and spoke with a nurse to see if that dosage was a proper dose but does not feel as if her questions were understood. Oxybutynin is continued as directed with benefit. The patient produced a urine sample which will be sent for urinalysis, urine cytology and urine culture. I have advised that she call back in 3 days to discuss the results of the urine culture. The prescribed antibiotic will be changed appropriately. No changes are made to her current medical regimen. She is to continue taking Oxybutynin and Tamsulosin as directed. If her symptoms resolve she can follow up in 3 months. If she continues to be symptomatic than I have advised that she return in 3 weeks for another evaluation. \par 06/12/2020: Pt contacted at [phone]. Gave permission to conduct a Telephone visit. Pt's pain persists. Fluconazole 100mg is working, but does not always. PT took Desipramine, but has stopped it. She has severe lower back pain. Urine on 06/01/2020 showed no abnormal results. She is experiencing vaginal swelling. Has had a partial hysterectomy. Ovaries are still intact. Advised to take 2 tablets of Desipramine qhs. Continue Tamsulosin and finish off Fluconazole. Pt will come for an office visit in 3 weeks. Preparation, visit and coordination of care took 11 minutes. \par \par 07/07/2020: Patient presents today for a follow up. Pt did well on Fluconazole. Pt had more burning with Desipramine. She has some stress incontinence and she is now wearing pads. Pt gets left sided flank pain. There is no additional pain with pressure on the area. Pt has disc herniation on the lower left side. Pt is still on Tamsulosin once daily after dinner. She feel she's emptying fine, but there is discomfort and leaking afterwards. Pt is on Oxybutynin every morning. \par \par Prescribed Duloxetine 20mg. \par Pt will provide a urine sample today for culture, cytology and analysis. \par Pt will call in 2 weeks to discuss the effect of her medications. \par \par 11/04/2021: Patient presents today due to a painful, burning sensation at the end of urination. Also c/o bloating. Admits some urinary urgency and double voiding this past week. She is no longer on duloxetine as it did not help. She was given Fluconazole by her other doctor, however she discontinued that as it also did not help her symptoms. Prior to this episode, she had been doing very well with just some stress incontinence when she sneezes. \par \par The patient produced a urine sample which will be sent for urinalysis, urine cytology, and urine culture. \par \par I prescribed the patient Tamsulosin 0.4 mg once daily after a meal. I advised the patient the side of effects of nasal congestion and light-headedness. I advised her to use saline nasal spray if she gets congestion but to avoid decongestion medication as this will worsen her urination.\par  \par I prescribed the patient oxybutynin ER 10 mg once daily.  I reminded the patient that this medication can sometimes cause constipation and dry mouth.\par \par I prescribed the patient Nitrofurantoin 100 mg BID. \par  \par Patient will either RTO or have a telehealth visit in one month for reassessment. If going back on these medications does not help her symptoms, will consider bimanual exam and cystoscopy. \par \par Preparation, in person office visit, and coordination of care: 30 minutes.

## 2021-11-06 NOTE — ADDENDUM
[FreeTextEntry1] : This note was authored by Henna Everett working as a scribe for Dr.Gary Baker. I, Dr. Danny Baker have reviewed the content of this note and confirm it is true and accurate. I personally performed the history and physical examination and made all the decisions 11/04/2021.

## 2021-11-06 NOTE — HISTORY OF PRESENT ILLNESS
[FreeTextEntry1] : Mrs Thomas is a 46 year old female presented for follow of incomplete bladder emptying and UTI. The patient takes tamsulosin 0.4 mg one half hour after meal twice daily. The patient continued to take nitrofurantoin macrocrystal 100 mg, just prior to or after sexual intercourse to prevent UTI. She reported she has not had any recent UTI. The patient has not had any gross hematuria. She reported to having rare episodes of dysuria. The patient wakes 1-2 times nightly to urinate. She reported that she is emptying better now with medication than in the past. \par 9/13/18: The patient returned today and reported she had an UTI recently and was prescribed levofloxacin. Complained of pelvic pain when sitting for long periods of time. Complained of pain inside the vagina, feels like 'the feeling you get right before you push a baby out during labor.' Denied vaginal discharge. Noted her UTIs may no be related to sex. Takes nitrofurantoin before sex. Currently taking tamsulosin twice daily. Denied chance of pregnancy. \par 10/01/18: The patient returned and reported her dysuria is resolving. Complained of mild headaches after starting Doxycycline. Had 3 days left for treatment. Denied vaginal itching. \par \par February 12, 2019 Laparoscopic supracervical hysterectomy and bilateral salpingectomy and cystoscopy.\par \par 4/2/19: The patient returned and reported that she has had dysuria. Noted that a urine culture on 3/20/19 was negative. Has light discharge. Started taking AZO one day ago, which hasn't helped her burning. Currently taking Tamsulosin. Patient denied gross hematuria, urgency, or incontinence. 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 Noted that after the surgery, the patient had a UTI. Patient took Amoxicillin and three days worth of Fluconazole. Patient's dysuria started after the surgery, but still didn't resolve after finishing her trial of antibiotics.\par 4/16/19: The patient returned and reported that her symptoms haven't improved since taking Fluconazole. Finished the trial four days ago. The burning occurs after urination. Denied burning during urination. Takes Tamsulosin BID. Complained of lower lumbar pain bilaterally especially paraspinous, but no spine pain. Denied tobacco use.\par 4/30/19: The patient returned today and reported that taking Oxybutynin ER 5 mg has improved the burning after urination by 70%. The pain used to be a severity of 10/10 and now it is 3/10. Has slight dry mouth. Denies constipation. Also taking Tamsulosin BID.\par 5/21/19: Female patient presents today for a follow up. Since her last visit she reports that her current medical regimen, which includes Tamsulosin 0.4 mg and Oxybutynin 10 mg continue to provide relief from her urinary symptoms. On occasion she still notes that there is a slight uncomfortable feeling during urination. Dry mouth is reported as a result of taking Oxybutynin and notes that it was less on the lower dose. She denies hematuria, urgency, burning during urination, and frequency. She wakes up once a night to urinate. Occasional intermittency is reported. \par 9/26/19: Patient presents today for a follow up. Today she states that a UTI occurred since her last visit. She had Nitrofurantoin at home and began taking one tablet BID once the symptoms began. She took it at that dose for 5 days and no relief was experienced. On the sixth day she began taking two tablets BID and that dosage relieved her symptoms. She called and spoke with a nurse to see if that dosage was a proper dose but does not feel as if her questions were understood. Oxybutynin is continued as directed with benefit. \par 06/12/2020: Pt contacted at [phone]. Gave permission to conduct a Telephone visit. Pt's pain persists. Fluconazole 100mg is working, but does not always. PT took Desipramine, but has stopped it. She has severe lower back pain. Urine on 06/01/2020 showed no abnormal results. She is experiencing vaginal swelling. Has had a partial hysterectomy. Ovaries are still intact.\par \par 07/07/2020: Patient presents today for a follow up. Pt did well on Fluconazole. Pt had more burning with Desipramine. She has some stress incontinence and she is now wearing pads. Pt gets left sided flank pain. There is no additional pain with pressure on the area. Pt has disc herniation on the lower left side. Pt is still on Tamsulosin once daily after dinner. She feel she's emptying fine, but there is discomfort and leaking afterwards. Pt is on Oxybutynin every morning. \par \par 11/04/2021: Patient presents today due to a painful, burning sensation at the end of urination. Also c/o bloating. Admits some urinary urgency and double voiding this past week. She is no longer on duloxetine as it did not help. She was given Fluconazole by her other doctor, however she discontinued that as it also did not help her symptoms. Prior to this episode, she had been doing very well with just some stress incontinence when she sneezes.

## 2021-11-10 ENCOUNTER — NON-APPOINTMENT (OUTPATIENT)
Age: 46
End: 2021-11-10

## 2021-11-11 ENCOUNTER — APPOINTMENT (OUTPATIENT)
Dept: UROLOGY | Facility: CLINIC | Age: 46
End: 2021-11-11
Payer: COMMERCIAL

## 2021-11-11 DIAGNOSIS — R68.82 DECREASED LIBIDO: ICD-10-CM

## 2021-11-11 DIAGNOSIS — R10.2 PELVIC AND PERINEAL PAIN: ICD-10-CM

## 2021-11-11 PROCEDURE — 99443: CPT

## 2021-11-11 NOTE — REVIEW OF SYSTEMS
[Burning Sensation] : burning sensation during urination [Negative] : Heme/Lymph [Hot Flashes] : hot flashes

## 2021-11-14 NOTE — ASSESSMENT
[FreeTextEntry1] : Mrs Thomas is a 46 year old female presented for follow of incomplete bladder emptying and UTI. The patient takes tamsulosin 0.4 mg one half hour after meal twice daily. The patient continued to take nitrofurantoin macrocrystal 100 mg, just prior to or after sexual intercourse to prevent UTI. She reported she has not had any recent UTI. The patient has not had any gross hematuria. She reported to having rare episodes of dysuria. The patient wakes 1-2 times nightly to urinate. She reported that she is emptying better now with medication than in the past. \par 9/13/18: The patient returned today and reported she had an UTI recently and was prescribed levofloxacin. Complained of pelvic pain when sitting for long periods of time. Complained of pain inside the vagina, feels like 'the feeling you get right before you push a baby out during labor.' Denied vaginal discharge. Noted her UTIs may no be related to sex. Takes nitrofurantoin before sex. Currently taking tamsulosin twice daily. Denied chance of pregnancy. \par 10/01/18: The patient returned and reported her dysuria is resolving. Complained of mild headaches after starting Doxycycline. Had 3 days left for treatment. Denied vaginal itching. \par \par 2/12/2019 Laparoscopic supracervical hysterectomy and bilateral salpingectomy and cystoscopy.\par \par 4/2/19: The patient returned and reported that she has had dysuria. Noted that a urine culture on 3/20/19 was negative. Has light discharge. Started taking AZO one day ago, which hasn't helped her burning. Currently taking Tamsulosin. Patient denied gross hematuria, urgency, or incontinence. 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 Noted that after the surgery, the patient had a UTI. Patient took Amoxicillin and three days worth of Fluconazole. Patient's dysuria started after the surgery, but still didn't resolve after finishing her trial of antibiotics.\par 4/16/19: The patient returned and reported that her symptoms haven't improved since taking Fluconazole. Finished the trial four days ago. The burning occurs after urination. Denied burning during urination. Takes Tamsulosin BID. Complained of lower lumbar pain bilaterally especially paraspinous, but no spine pain. Denied tobacco use.\par 4/30/19: The patient returned today and reported that taking Oxybutynin ER 5 mg has improved the burning after urination by 70%. The pain used to be a severity of 10/10 and now it is 3/10. Has slight dry mouth. Denies constipation. Also taking Tamsulosin BID.\par 5/21/19: Female patient presents today for a follow up. Since her last visit she reports that her current medical regimen, which includes Tamsulosin 0.4 mg and Oxybutynin 10 mg continue to provide relief from her urinary symptoms. On occasion she still notes that there is a slight uncomfortable feeling during urination. Dry mouth is reported as a result of taking Oxybutynin and notes that it was less on the lower dose. She denies hematuria, urgency, burning during urination, and frequency. She wakes up once a night to urinate. Occasional intermittency is reported. \par 9/26/19: Patient presents today for a follow up. Today she states that a UTI occurred since her last visit. She had Nitrofurantoin at home and began taking one tablet BID once the symptoms began. She took it at that dose for 5 days and no relief was experienced. On the sixth day she began taking two tablets BID and that dosage relieved her symptoms. She called and spoke with a nurse to see if that dosage was a proper dose but does not feel as if her questions were understood. Oxybutynin is continued as directed with benefit. The patient produced a urine sample which will be sent for urinalysis, urine cytology and urine culture. I have advised that she call back in 3 days to discuss the results of the urine culture. The prescribed antibiotic will be changed appropriately. No changes are made to her current medical regimen. She is to continue taking Oxybutynin and Tamsulosin as directed. If her symptoms resolve she can follow up in 3 months. If she continues to be symptomatic than I have advised that she return in 3 weeks for another evaluation. \par 06/12/2020: Pt contacted at [phone]. Gave permission to conduct a Telephone visit. Pt's pain persists. Fluconazole 100mg is working, but does not always. PT took Desipramine, but has stopped it. She has severe lower back pain. Urine on 06/01/2020 showed no abnormal results. She is experiencing vaginal swelling. Has had a partial hysterectomy. Ovaries are still intact. Advised to take 2 tablets of Desipramine qhs. Continue Tamsulosin and finish off Fluconazole. Pt will come for an office visit in 3 weeks. Preparation, visit and coordination of care took 11 minutes. \par \par 07/07/2020: Patient presents today for a follow up. Pt did well on Fluconazole. Pt had more burning with Desipramine. She has some stress incontinence and she is now wearing pads. Pt gets left sided flank pain. There is no additional pain with pressure on the area. Pt has disc herniation on the lower left side. Pt is still on Tamsulosin once daily after dinner. She feel she's emptying fine, but there is discomfort and leaking afterwards. Pt is on Oxybutynin every morning. \par \par Prescribed Duloxetine 20mg. \par Pt will provide a urine sample today for culture, cytology and analysis. \par Pt will call in 2 weeks to discuss the effect of her medications. \par \par 11/04/2021: Patient presents today due to a painful, burning sensation at the end of urination. Also c/o bloating. Admits some urinary urgency and double voiding this past week. She is no longer on duloxetine as it did not help. She was given Fluconazole by her other doctor, however she discontinued that as it also did not help her symptoms. Prior to this episode, she had been doing very well with just some stress incontinence when she sneezes. \par \par The patient produced a urine sample which will be sent for urinalysis, urine cytology, and urine culture. \par \par I prescribed the patient Tamsulosin 0.4 mg once daily after a meal. I advised the patient the side of effects of nasal congestion and light-headedness. I advised her to use saline nasal spray if she gets congestion but to avoid decongestion medication as this will worsen her urination.\par  I prescribed the patient oxybutynin ER 10 mg once daily.  I reminded the patient that this medication can sometimes cause constipation and dry mouth.\par I prescribed the patient Nitrofurantoin 100 mg BID. \par Patient will either RTO or have a telehealth visit in one month for reassessment. If going back on these medications does not help her symptoms, will consider bimanual exam and cystoscopy. \par \par 11/11/2021: Patient presents today for a follow up telephone visit for which she gave permission for. She has been taking tamsulosin 0.4 mg once daily after a meal and oxybutynin ER 10 mg once daily for urinary stress incontinence. Additionally she was put on Nitrofurantoin 100 mg BID for ten days due to her complaints of burning at the end of urination, bloating, and double voiding. UA from 11/4/2021 showed a high specific gravity and a few calcium oxalate crystals. Urine cytology was benign. Urine culture was no significant growth. Today, she states that she is doing about the same with very little improvement. Her discomfort and burning after urination is just about the same and she still feels bloated. Denies urinary urgency. Pt has a +SHx of partial hysterectomy due to bleeding of the fibroids. Still has both ovaries. Gets occasional hot flashes. \par \par Advised the patient to increase her water intake so the irritants in her urine are more dilute. \par \par The patient will undergo a cystoscopy. I advised the patient to eat on the day of the procedure and can drive themselves if they like. I informed the patient we will insert lidocaine for local anesthesia. I discussed the risk of infection, but informed the patient that we will provide an antibiotic after the procedure and at bedtime. \par \par Patient will continue tamsulosin 0.4 mg once daily and oxybutynin ER 10 mg once daily. \par \par Patient will RTO next week for a cystoscopy. \par \par Preparation, telephone visit, and coordination of care took : 21 minutes.

## 2021-11-14 NOTE — HISTORY OF PRESENT ILLNESS
[FreeTextEntry1] : Mrs Thomas is a 46 year old female presented for follow of incomplete bladder emptying and UTI. The patient takes tamsulosin 0.4 mg one half hour after meal twice daily. The patient continued to take nitrofurantoin macrocrystal 100 mg, just prior to or after sexual intercourse to prevent UTI. She reported she has not had any recent UTI. The patient has not had any gross hematuria. She reported to having rare episodes of dysuria. The patient wakes 1-2 times nightly to urinate. She reported that she is emptying better now with medication than in the past. \par 9/13/18: The patient returned today and reported she had an UTI recently and was prescribed levofloxacin. Complained of pelvic pain when sitting for long periods of time. Complained of pain inside the vagina, feels like 'the feeling you get right before you push a baby out during labor.' Denied vaginal discharge. Noted her UTIs may no be related to sex. Takes nitrofurantoin before sex. Currently taking tamsulosin twice daily. Denied chance of pregnancy. \par 10/01/18: The patient returned and reported her dysuria is resolving. Complained of mild headaches after starting Doxycycline. Had 3 days left for treatment. Denied vaginal itching. \par \par February 12, 2019 Laparoscopic supracervical hysterectomy and bilateral salpingectomy and cystoscopy.\par \par 4/2/19: The patient returned and reported that she has had dysuria. Noted that a urine culture on 3/20/19 was negative. Has light discharge. Started taking AZO one day ago, which hasn't helped her burning. Currently taking Tamsulosin. Patient denied gross hematuria, urgency, or incontinence. 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 Noted that after the surgery, the patient had a UTI. Patient took Amoxicillin and three days worth of Fluconazole. Patient's dysuria started after the surgery, but still didn't resolve after finishing her trial of antibiotics.\par 4/16/19: The patient returned and reported that her symptoms haven't improved since taking Fluconazole. Finished the trial four days ago. The burning occurs after urination. Denied burning during urination. Takes Tamsulosin BID. Complained of lower lumbar pain bilaterally especially paraspinous, but no spine pain. Denied tobacco use.\par 4/30/19: The patient returned today and reported that taking Oxybutynin ER 5 mg has improved the burning after urination by 70%. The pain used to be a severity of 10/10 and now it is 3/10. Has slight dry mouth. Denies constipation. Also taking Tamsulosin BID.\par 5/21/19: Female patient presents today for a follow up. Since her last visit she reports that her current medical regimen, which includes Tamsulosin 0.4 mg and Oxybutynin 10 mg continue to provide relief from her urinary symptoms. On occasion she still notes that there is a slight uncomfortable feeling during urination. Dry mouth is reported as a result of taking Oxybutynin and notes that it was less on the lower dose. She denies hematuria, urgency, burning during urination, and frequency. She wakes up once a night to urinate. Occasional intermittency is reported. \par 9/26/19: Patient presents today for a follow up. Today she states that a UTI occurred since her last visit. She had Nitrofurantoin at home and began taking one tablet BID once the symptoms began. She took it at that dose for 5 days and no relief was experienced. On the sixth day she began taking two tablets BID and that dosage relieved her symptoms. She called and spoke with a nurse to see if that dosage was a proper dose but does not feel as if her questions were understood. Oxybutynin is continued as directed with benefit. \par 06/12/2020: Pt contacted at [phone]. Gave permission to conduct a Telephone visit. Pt's pain persists. Fluconazole 100mg is working, but does not always. PT took Desipramine, but has stopped it. She has severe lower back pain. Urine on 06/01/2020 showed no abnormal results. She is experiencing vaginal swelling. Has had a partial hysterectomy. Ovaries are still intact.\par \par 07/07/2020: Patient presents today for a follow up. Pt did well on Fluconazole. Pt had more burning with Desipramine. She has some stress incontinence and she is now wearing pads. Pt gets left sided flank pain. There is no additional pain with pressure on the area. Pt has disc herniation on the lower left side. Pt is still on Tamsulosin once daily after dinner. She feel she's emptying fine, but there is discomfort and leaking afterwards. Pt is on Oxybutynin every morning. \par \par 11/04/2021: Patient presents today due to a painful, burning sensation at the end of urination. Also c/o bloating. Admits some urinary urgency and double voiding this past week. She is no longer on duloxetine as it did not help. She was given Fluconazole by her other doctor, however she discontinued that as it also did not help her symptoms. Prior to this episode, she had been doing very well with just some stress incontinence when she sneezes.\par \par 11/11/2021: Patient presents today for a follow up telephone visit for which she gave permission for. She has been taking tamsulosin 0.4 mg once daily after a meal and oxybutynin ER 10 mg once daily for urinary stress incontinence. Additionally she was put on Nitrofurantoin 100 mg BID for ten days due to her complaints of burning at the end of urination, bloating, and double voiding. UA from 11/4/2021 showed a high specific gravity and a few calcium oxalate crystals. Urine cytology was benign. Urine culture was no significant growth. Today, she states that she is doing about the same with very little improvement. Her discomfort and burning after urination is just about the same and she still feels bloated. Denies urinary urgency. Pt has a +SHx of partial hysterectomy due to bleeding of the fibroids. Still has both ovaries. Gets occasional hot flashes.

## 2021-11-14 NOTE — ADDENDUM
[FreeTextEntry1] : This note was authored by Henna Everett working as a scribe for Dr.Gary Baker. I, Dr. Danny Baker have reviewed the content of this note and confirm it is true and accurate. I personally performed the history and physical examination and made all the decisions 11/11/2021.

## 2021-11-17 ENCOUNTER — APPOINTMENT (OUTPATIENT)
Dept: UROLOGY | Facility: CLINIC | Age: 46
End: 2021-11-17

## 2021-11-23 ENCOUNTER — APPOINTMENT (OUTPATIENT)
Dept: UROLOGY | Facility: CLINIC | Age: 46
End: 2021-11-23
Payer: COMMERCIAL

## 2021-11-23 VITALS
OXYGEN SATURATION: 98 % | HEART RATE: 69 BPM | TEMPERATURE: 98.6 F | SYSTOLIC BLOOD PRESSURE: 104 MMHG | DIASTOLIC BLOOD PRESSURE: 69 MMHG

## 2021-11-23 DIAGNOSIS — N94.9 UNSPECIFIED CONDITION ASSOCIATED WITH FEMALE GENITAL ORGANS AND MENSTRUAL CYCLE: ICD-10-CM

## 2021-11-23 PROCEDURE — 99213 OFFICE O/P EST LOW 20 MIN: CPT | Mod: 25

## 2021-11-23 PROCEDURE — 52000 CYSTOURETHROSCOPY: CPT

## 2021-11-23 RX ORDER — FAMOTIDINE 40 MG/1
40 TABLET, FILM COATED ORAL DAILY
Qty: 30 | Refills: 11 | Status: ACTIVE | COMMUNITY
Start: 2021-11-23 | End: 1900-01-01

## 2021-11-23 RX ORDER — OMEPRAZOLE 40 MG/1
40 CAPSULE, DELAYED RELEASE ORAL DAILY
Qty: 30 | Refills: 0 | Status: COMPLETED | COMMUNITY
Start: 2020-05-20 | End: 2021-11-23

## 2021-11-23 NOTE — REVIEW OF SYSTEMS
[Burning Sensation] : burning sensation during urination [Hot Flashes] : hot flashes [Negative] : Heme/Lymph

## 2021-11-25 PROBLEM — N94.9 PAIN OF FEMALE GENITALIA: Status: ACTIVE | Noted: 2020-06-12

## 2021-11-25 NOTE — ASSESSMENT
[FreeTextEntry1] : Mrs Thomas is a 46 year old female presented for follow of incomplete bladder emptying and UTI. The patient takes tamsulosin 0.4 mg one half hour after meal twice daily. The patient continued to take nitrofurantoin macrocrystal 100 mg, just prior to or after sexual intercourse to prevent UTI. She reported she has not had any recent UTI. The patient has not had any gross hematuria. She reported to having rare episodes of dysuria. The patient wakes 1-2 times nightly to urinate. She reported that she is emptying better now with medication than in the past. \par 9/13/18: The patient returned today and reported she had an UTI recently and was prescribed levofloxacin. Complained of pelvic pain when sitting for long periods of time. Complained of pain inside the vagina, feels like 'the feeling you get right before you push a baby out during labor.' Denied vaginal discharge. Noted her UTIs may no be related to sex. Takes nitrofurantoin before sex. Currently taking tamsulosin twice daily. Denied chance of pregnancy. \par 10/01/18: The patient returned and reported her dysuria is resolving. Complained of mild headaches after starting Doxycycline. Had 3 days left for treatment. Denied vaginal itching. \par \par 2/12/2019 Laparoscopic supracervical hysterectomy and bilateral salpingectomy and cystoscopy.\par \par 4/2/19: The patient returned and reported that she has had dysuria. Noted that a urine culture on 3/20/19 was negative. Has light discharge. Started taking AZO one day ago, which hasn't helped her burning. Currently taking Tamsulosin. Patient denied gross hematuria, urgency, or incontinence. 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 Noted that after the surgery, the patient had a UTI. Patient took Amoxicillin and three days worth of Fluconazole. Patient's dysuria started after the surgery, but still didn't resolve after finishing her trial of antibiotics.\par 4/16/19: The patient returned and reported that her symptoms haven't improved since taking Fluconazole. Finished the trial four days ago. The burning occurs after urination. Denied burning during urination. Takes Tamsulosin BID. Complained of lower lumbar pain bilaterally especially paraspinous, but no spine pain. Denied tobacco use.\par 4/30/19: The patient returned today and reported that taking Oxybutynin ER 5 mg has improved the burning after urination by 70%. The pain used to be a severity of 10/10 and now it is 3/10. Has slight dry mouth. Denies constipation. Also taking Tamsulosin BID.\par 5/21/19: Female patient presents today for a follow up. Since her last visit she reports that her current medical regimen, which includes Tamsulosin 0.4 mg and Oxybutynin 10 mg continue to provide relief from her urinary symptoms. On occasion she still notes that there is a slight uncomfortable feeling during urination. Dry mouth is reported as a result of taking Oxybutynin and notes that it was less on the lower dose. She denies hematuria, urgency, burning during urination, and frequency. She wakes up once a night to urinate. Occasional intermittency is reported. \par 9/26/19: Patient presents today for a follow up. Today she states that a UTI occurred since her last visit. She had Nitrofurantoin at home and began taking one tablet BID once the symptoms began. She took it at that dose for 5 days and no relief was experienced. On the sixth day she began taking two tablets BID and that dosage relieved her symptoms. She called and spoke with a nurse to see if that dosage was a proper dose but does not feel as if her questions were understood. Oxybutynin is continued as directed with benefit. The patient produced a urine sample which will be sent for urinalysis, urine cytology and urine culture. I have advised that she call back in 3 days to discuss the results of the urine culture. The prescribed antibiotic will be changed appropriately. No changes are made to her current medical regimen. She is to continue taking Oxybutynin and Tamsulosin as directed. If her symptoms resolve she can follow up in 3 months. If she continues to be symptomatic than I have advised that she return in 3 weeks for another evaluation. \par 06/12/2020: Pt contacted at [phone]. Gave permission to conduct a Telephone visit. Pt's pain persists. Fluconazole 100mg is working, but does not always. PT took Desipramine, but has stopped it. She has severe lower back pain. Urine on 06/01/2020 showed no abnormal results. She is experiencing vaginal swelling. Has had a partial hysterectomy. Ovaries are still intact. Advised to take 2 tablets of Desipramine qhs. Continue Tamsulosin and finish off Fluconazole. Pt will come for an office visit in 3 weeks. Preparation, visit and coordination of care took 11 minutes. \par \par 07/07/2020: Patient presents today for a follow up. Pt did well on Fluconazole. Pt had more burning with Desipramine. She has some stress incontinence and she is now wearing pads. Pt gets left sided flank pain. There is no additional pain with pressure on the area. Pt has disc herniation on the lower left side. Pt is still on Tamsulosin once daily after dinner. She feel she's emptying fine, but there is discomfort and leaking afterwards. Pt is on Oxybutynin every morning. \par \par 11/04/2021: Patient presents today due to a painful, burning sensation at the end of urination. Also c/o bloating. Admits some urinary urgency and double voiding this past week. She is no longer on duloxetine as it did not help. She was given Fluconazole by her other doctor, however she discontinued that as it also did not help her symptoms. Prior to this episode, she had been doing very well with just some stress incontinence when she sneezes. \par \par 11/11/2021: Patient presents today for a follow up telephone visit for which she gave permission for. She has been taking tamsulosin 0.4 mg once daily after a meal and oxybutynin ER 10 mg once daily for urinary stress incontinence. Additionally she was put on Nitrofurantoin 100 mg BID for ten days due to her complaints of burning at the end of urination, bloating, and double voiding. UA from 11/4/2021 showed a high specific gravity and a few calcium oxalate crystals. Urine cytology was benign. Urine culture was no significant growth. Today, she states that she is doing about the same with very little improvement. Her discomfort and burning after urination is just about the same and she still feels bloated. Denies urinary urgency. Pt has a +SHx of partial hysterectomy due to bleeding of the fibroids. Still has both ovaries. Gets occasional hot flashes. \par \par 11/23/2021: Patient presents today for cystoscopy for hx of incomplete bladder emptying. Continues Tamsulosin 0.4mg once daily and Oxybutynin ER 10mg. However, feels she is having heartburn although unsure which medication it is. Finished course of Nitrofurantoin. Does not have burning after urination today. States her burning pain stopped 4 days ago. Sometimes has to strain to urinate, but overall feels well and better than before. \par \par Cystoscopy findings: Urethral meatus was small but no stricture. Dilated a short amount with blue cone dilator which was successful after adding additional 2% lidocaine jelly and cystoscopy was passed. Bladder mucosa is normal pale pink. Right ureteral orifice is a slit. Left ureteral orifice is a slit. Both orifices have normal positions on trigone. Normal inferior bladder. Bladder is 1+ trabeculated. No bladder tumors or stones. No areas of inflammation. Inflammatory polyp at 1 o'clock at bladder neck. No urethral strictures, tumors, stones, or inflammatory patches. \par \par Two tablets of Bactrim are provided. Patient is to take one tablet now and one tablet tonight to limit their risk of infection. \par \par It is likely Oxybutynin causing heartburn, so I have prescribed Famotidine to take to prevent heartburn. Continue Tamsulosin 0.4mg once daily and Oxybutynin ER 10mg once daily. \par \par The patient produced a urine sample which will be sent for urinalysis, urine cytology, and urine culture. \par Blood work today includes BMP. \par \par RTO in 3 months for follow up or sooner if new urinary symptoms develop. \par \par Preparation, in-person office visit, and coordination of care outside or exclusive of procedure time took: 22 minutes

## 2021-11-25 NOTE — ADDENDUM
[FreeTextEntry1] : I, Yvonne Treviñoin, acted solely as a scribe for Dr. Danny Baker on this date 11/23/2021.\par \par All medical record entries made by the Scribe were at my, Dr. Danny Baker, direction and personally dictated by me on 11/23/2021. I have reviewed the chart and agree that the record accurately reflects my personal performance of the history, physical exam, assessment and plan.  I have also personally directed, reviewed and agreed with the chart.

## 2021-11-25 NOTE — PHYSICAL EXAM
Patient contacted and reminded of upcoming appointment on Tuesday, 11/12/19.  Patient instructed to stay well hydrated on Monday and to drink 2 glasses of water one hour prior to appointment on Tuesday.  Patient also instructed  Infusion can cause body aches similar to those experienced with flu.  Advised patient to take 2 Tylenol or 2 Advil one hour prior to appointment to help lessen side effects.  Patient verbalized understanding of instructions.       [General Appearance - Well Developed] : well developed [General Appearance - Well Nourished] : well nourished [Normal Appearance] : normal appearance [Well Groomed] : well groomed [General Appearance - In No Acute Distress] : no acute distress [Abdomen Soft] : soft [Abdomen Tenderness] : non-tender [Costovertebral Angle Tenderness] : no ~M costovertebral angle tenderness [Edema] : no peripheral edema [] : no respiratory distress [Respiration, Rhythm And Depth] : normal respiratory rhythm and effort [Exaggerated Use Of Accessory Muscles For Inspiration] : no accessory muscle use [Oriented To Time, Place, And Person] : oriented to person, place, and time [Affect] : the affect was normal [Mood] : the mood was normal [Not Anxious] : not anxious [Normal Station and Gait] : the gait and station were normal for the patient's age [No Focal Deficits] : no focal deficits [FreeTextEntry1] : Vulva and introitus are normal color pink, not red or inflamed. Urethral meatus normal, no caruncle. Urethra supple, nontender, without masses.

## 2021-11-25 NOTE — HISTORY OF PRESENT ILLNESS
No [FreeTextEntry1] : Mrs Thomas is a 46 year old female presented for follow of incomplete bladder emptying and UTI. The patient takes tamsulosin 0.4 mg one half hour after meal twice daily. The patient continued to take nitrofurantoin macrocrystal 100 mg, just prior to or after sexual intercourse to prevent UTI. She reported she has not had any recent UTI. The patient has not had any gross hematuria. She reported to having rare episodes of dysuria. The patient wakes 1-2 times nightly to urinate. She reported that she is emptying better now with medication than in the past. \par 9/13/18: The patient returned today and reported she had an UTI recently and was prescribed levofloxacin. Complained of pelvic pain when sitting for long periods of time. Complained of pain inside the vagina, feels like 'the feeling you get right before you push a baby out during labor.' Denied vaginal discharge. Noted her UTIs may no be related to sex. Takes nitrofurantoin before sex. Currently taking tamsulosin twice daily. Denied chance of pregnancy. \par 10/01/18: The patient returned and reported her dysuria is resolving. Complained of mild headaches after starting Doxycycline. Had 3 days left for treatment. Denied vaginal itching. \par \par February 12, 2019 Laparoscopic supracervical hysterectomy and bilateral salpingectomy and cystoscopy.\par \par 4/2/19: The patient returned and reported that she has had dysuria. Noted that a urine culture on 3/20/19 was negative. Has light discharge. Started taking AZO one day ago, which hasn't helped her burning. Currently taking Tamsulosin. Patient denied gross hematuria, urgency, or incontinence. 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 Noted that after the surgery, the patient had a UTI. Patient took Amoxicillin and three days worth of Fluconazole. Patient's dysuria started after the surgery, but still didn't resolve after finishing her trial of antibiotics.\par 4/16/19: The patient returned and reported that her symptoms haven't improved since taking Fluconazole. Finished the trial four days ago. The burning occurs after urination. Denied burning during urination. Takes Tamsulosin BID. Complained of lower lumbar pain bilaterally especially paraspinous, but no spine pain. Denied tobacco use.\par 4/30/19: The patient returned today and reported that taking Oxybutynin ER 5 mg has improved the burning after urination by 70%. The pain used to be a severity of 10/10 and now it is 3/10. Has slight dry mouth. Denies constipation. Also taking Tamsulosin BID.\par 5/21/19: Female patient presents today for a follow up. Since her last visit she reports that her current medical regimen, which includes Tamsulosin 0.4 mg and Oxybutynin 10 mg continue to provide relief from her urinary symptoms. On occasion she still notes that there is a slight uncomfortable feeling during urination. Dry mouth is reported as a result of taking Oxybutynin and notes that it was less on the lower dose. She denies hematuria, urgency, burning during urination, and frequency. She wakes up once a night to urinate. Occasional intermittency is reported. \par 9/26/19: Patient presents today for a follow up. Today she states that a UTI occurred since her last visit. She had Nitrofurantoin at home and began taking one tablet BID once the symptoms began. She took it at that dose for 5 days and no relief was experienced. On the sixth day she began taking two tablets BID and that dosage relieved her symptoms. She called and spoke with a nurse to see if that dosage was a proper dose but does not feel as if her questions were understood. Oxybutynin is continued as directed with benefit. \par 06/12/2020: Pt contacted at [phone]. Gave permission to conduct a Telephone visit. Pt's pain persists. Fluconazole 100mg is working, but does not always. PT took Desipramine, but has stopped it. She has severe lower back pain. Urine on 06/01/2020 showed no abnormal results. She is experiencing vaginal swelling. Has had a partial hysterectomy. Ovaries are still intact.\par \par 07/07/2020: Patient presents today for a follow up. Pt did well on Fluconazole. Pt had more burning with Desipramine. She has some stress incontinence and she is now wearing pads. Pt gets left sided flank pain. There is no additional pain with pressure on the area. Pt has disc herniation on the lower left side. Pt is still on Tamsulosin once daily after dinner. She feel she's emptying fine, but there is discomfort and leaking afterwards. Pt is on Oxybutynin every morning. \par \par 11/04/2021: Patient presents today due to a painful, burning sensation at the end of urination. Also c/o bloating. Admits some urinary urgency and double voiding this past week. She is no longer on duloxetine as it did not help. She was given Fluconazole by her other doctor, however she discontinued that as it also did not help her symptoms. Prior to this episode, she had been doing very well with just some stress incontinence when she sneezes.\par \par 11/11/2021: Patient presents today for a follow up telephone visit for which she gave permission for. She has been taking tamsulosin 0.4 mg once daily after a meal and oxybutynin ER 10 mg once daily for urinary stress incontinence. Additionally she was put on Nitrofurantoin 100 mg BID for ten days due to her complaints of burning at the end of urination, bloating, and double voiding. UA from 11/4/2021 showed a high specific gravity and a few calcium oxalate crystals. Urine cytology was benign. Urine culture was no significant growth. Today, she states that she is doing about the same with very little improvement. Her discomfort and burning after urination is just about the same and she still feels bloated. Denies urinary urgency. Pt has a +SHx of partial hysterectomy due to bleeding of the fibroids. Still has both ovaries. Gets occasional hot flashes. \par \par 11/23/2021: Patient presents today for cystoscopy for hx of incomplete bladder emptying. Continues Tamsulosin 0.4mg once daily and Oxybutynin ER 10mg. However, feels she is having heartburn although unsure which medication it is. Finished course of Nitrofurantoin. Does not have burning after urination today. States her burning pain stopped 4 days ago. Sometimes has to strain to urinate, but overall feels well and better than before.

## 2021-11-30 ENCOUNTER — APPOINTMENT (OUTPATIENT)
Dept: UROLOGY | Facility: CLINIC | Age: 46
End: 2021-11-30

## 2021-12-10 LAB
ANION GAP SERPL CALC-SCNC: 12 MMOL/L
APPEARANCE: CLEAR
BACTERIA UR CULT: ABNORMAL
BACTERIA: NEGATIVE
BILIRUBIN URINE: NEGATIVE
BLOOD URINE: NEGATIVE
BUN SERPL-MCNC: 9 MG/DL
CALCIUM SERPL-MCNC: 9.9 MG/DL
CHLORIDE SERPL-SCNC: 101 MMOL/L
CO2 SERPL-SCNC: 26 MMOL/L
COLOR: COLORLESS
CREAT SERPL-MCNC: 0.74 MG/DL
GLUCOSE QUALITATIVE U: NEGATIVE
GLUCOSE SERPL-MCNC: 82 MG/DL
HYALINE CASTS: 0 /LPF
KETONES URINE: NEGATIVE
LEUKOCYTE ESTERASE URINE: NEGATIVE
MICROSCOPIC-UA: NORMAL
NITRITE URINE: NEGATIVE
PH URINE: 7
POTASSIUM SERPL-SCNC: 4.5 MMOL/L
PROTEIN URINE: NEGATIVE
RED BLOOD CELLS URINE: 1 /HPF
SODIUM SERPL-SCNC: 140 MMOL/L
SPECIFIC GRAVITY URINE: 1.01
SQUAMOUS EPITHELIAL CELLS: 1 /HPF
URINE CYTOLOGY: NORMAL
UROBILINOGEN URINE: NORMAL
WHITE BLOOD CELLS URINE: 0 /HPF

## 2022-01-19 RX ORDER — OXYBUTYNIN CHLORIDE 10 MG/1
10 TABLET, EXTENDED RELEASE ORAL
Qty: 90 | Refills: 3 | Status: ACTIVE | COMMUNITY
Start: 2019-04-16 | End: 1900-01-01

## 2022-02-28 ENCOUNTER — APPOINTMENT (OUTPATIENT)
Dept: UROLOGY | Facility: CLINIC | Age: 47
End: 2022-02-28

## 2022-06-14 DIAGNOSIS — Z86.018 PERSONAL HISTORY OF OTHER BENIGN NEOPLASM: ICD-10-CM

## 2022-08-02 ENCOUNTER — APPOINTMENT (OUTPATIENT)
Dept: OBGYN | Facility: CLINIC | Age: 47
End: 2022-08-02

## 2022-11-17 ENCOUNTER — APPOINTMENT (OUTPATIENT)
Dept: UROLOGY | Facility: CLINIC | Age: 47
End: 2022-11-17

## 2022-11-17 DIAGNOSIS — R10.30 LOWER ABDOMINAL PAIN, UNSPECIFIED: ICD-10-CM

## 2022-11-17 DIAGNOSIS — I82.891 CHRONIC EMBOLISM AND THROMBOSIS OF OTHER SPECIFIED VEINS: ICD-10-CM

## 2022-11-17 PROCEDURE — 99214 OFFICE O/P EST MOD 30 MIN: CPT | Mod: 95

## 2022-11-18 LAB
ALBUMIN SERPL ELPH-MCNC: 4.6 G/DL
ALP BLD-CCNC: 80 U/L
ALT SERPL-CCNC: 14 U/L
ANION GAP SERPL CALC-SCNC: 16 MMOL/L
AST SERPL-CCNC: 16 U/L
BASOPHILS # BLD AUTO: 0.03 K/UL
BASOPHILS NFR BLD AUTO: 0.6 %
BILIRUB SERPL-MCNC: 0.4 MG/DL
BUN SERPL-MCNC: 14 MG/DL
CALCIUM SERPL-MCNC: 9.8 MG/DL
CHLORIDE SERPL-SCNC: 102 MMOL/L
CO2 SERPL-SCNC: 22 MMOL/L
CREAT SERPL-MCNC: 0.68 MG/DL
EGFR: 108 ML/MIN/1.73M2
EOSINOPHIL # BLD AUTO: 0.06 K/UL
EOSINOPHIL NFR BLD AUTO: 1.2 %
GLUCOSE SERPL-MCNC: 90 MG/DL
HCT VFR BLD CALC: 41.2 %
HGB BLD-MCNC: 13.9 G/DL
IMM GRANULOCYTES NFR BLD AUTO: 0.2 %
LYMPHOCYTES # BLD AUTO: 2.25 K/UL
LYMPHOCYTES NFR BLD AUTO: 44.6 %
MAN DIFF?: NORMAL
MCHC RBC-ENTMCNC: 29.9 PG
MCHC RBC-ENTMCNC: 33.7 GM/DL
MCV RBC AUTO: 88.6 FL
MONOCYTES # BLD AUTO: 0.29 K/UL
MONOCYTES NFR BLD AUTO: 5.8 %
NEUTROPHILS # BLD AUTO: 2.4 K/UL
NEUTROPHILS NFR BLD AUTO: 47.6 %
PLATELET # BLD AUTO: 293 K/UL
POTASSIUM SERPL-SCNC: 4 MMOL/L
PROT SERPL-MCNC: 8.1 G/DL
RBC # BLD: 4.65 M/UL
RBC # FLD: 13.2 %
SODIUM SERPL-SCNC: 139 MMOL/L
WBC # FLD AUTO: 5.04 K/UL

## 2022-11-19 LAB
BACTERIA UR CULT: NORMAL
URINE CYTOLOGY: NORMAL

## 2022-11-29 PROBLEM — I82.891: Status: ACTIVE | Noted: 2018-12-11

## 2022-11-29 NOTE — ADDENDUM
[FreeTextEntry1] : This note was authored by Henna Everett working as a scribe for Dr.Gary Baker. I, Dr. Danny Baker have reviewed the content of this note and confirm it is true and accurate. I personally performed the history and physical examination and made all the decisions 11/17/2022

## 2022-11-29 NOTE — HISTORY OF PRESENT ILLNESS
[FreeTextEntry1] : Mrs Charles is a 47 year old female presented for follow of incomplete bladder emptying and UTI. The patient takes tamsulosin 0.4 mg one half hour after meal twice daily. The patient continued to take nitrofurantoin macrocrystal 100 mg, just prior to or after sexual intercourse to prevent UTI. She reported she has not had any recent UTI. The patient has not had any gross hematuria. She reported to having rare episodes of dysuria. The patient wakes 1-2 times nightly to urinate. She reported that she is emptying better now with medication than in the past. \par 9/13/18: The patient returned today and reported she had an UTI recently and was prescribed levofloxacin. Complained of pelvic pain when sitting for long periods of time. Complained of pain inside the vagina, feels like 'the feeling you get right before you push a baby out during labor.' Denied vaginal discharge. Noted her UTIs may no be related to sex. Takes nitrofurantoin before sex. Currently taking tamsulosin twice daily. Denied chance of pregnancy. \par 10/01/18: The patient returned and reported her dysuria is resolving. Complained of mild headaches after starting Doxycycline. Had 3 days left for treatment. Denied vaginal itching. \par \par February 12, 2019 Laparoscopic supracervical hysterectomy and bilateral salpingectomy and cystoscopy.\par \par 4/2/19: The patient returned and reported that she has had dysuria. Noted that a urine culture on 3/20/19 was negative. Has light discharge. Started taking AZO one day ago, which hasn't helped her burning. Currently taking Tamsulosin. Patient denied gross hematuria, urgency, or incontinence. 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 Noted that after the surgery, the patient had a UTI. Patient took Amoxicillin and three days worth of Fluconazole. Patient's dysuria started after the surgery, but still didn't resolve after finishing her trial of antibiotics.\par 4/16/19: The patient returned and reported that her symptoms haven't improved since taking Fluconazole. Finished the trial four days ago. The burning occurs after urination. Denied burning during urination. Takes Tamsulosin BID. Complained of lower lumbar pain bilaterally especially paraspinous, but no spine pain. Denied tobacco use.\par 4/30/19: The patient returned today and reported that taking Oxybutynin ER 5 mg has improved the burning after urination by 70%. The pain used to be a severity of 10/10 and now it is 3/10. Has slight dry mouth. Denies constipation. Also taking Tamsulosin BID.\par 5/21/19: Female patient presents today for a follow up. Since her last visit she reports that her current medical regimen, which includes Tamsulosin 0.4 mg and Oxybutynin 10 mg continue to provide relief from her urinary symptoms. On occasion she still notes that there is a slight uncomfortable feeling during urination. Dry mouth is reported as a result of taking Oxybutynin and notes that it was less on the lower dose. She denies hematuria, urgency, burning during urination, and frequency. She wakes up once a night to urinate. Occasional intermittency is reported. \par 9/26/19: Patient presents today for a follow up. Today she states that a UTI occurred since her last visit. She had Nitrofurantoin at home and began taking one tablet BID once the symptoms began. She took it at that dose for 5 days and no relief was experienced. On the sixth day she began taking two tablets BID and that dosage relieved her symptoms. She called and spoke with a nurse to see if that dosage was a proper dose but does not feel as if her questions were understood. Oxybutynin is continued as directed with benefit. \par 06/12/2020: Pt contacted at [phone]. Gave permission to conduct a Telephone visit. Pt's pain persists. Fluconazole 100mg is working, but does not always. PT took Desipramine, but has stopped it. She has severe lower back pain. Urine on 06/01/2020 showed no abnormal results. She is experiencing vaginal swelling. Has had a partial hysterectomy. Ovaries are still intact.\par \par 07/07/2020: Patient presents today for a follow up. Pt did well on Fluconazole. Pt had more burning with Desipramine. She has some stress incontinence and she is now wearing pads. Pt gets left sided flank pain. There is no additional pain with pressure on the area. Pt has disc herniation on the lower left side. Pt is still on Tamsulosin once daily after dinner. She feel she's emptying fine, but there is discomfort and leaking afterwards. Pt is on Oxybutynin every morning. \par \par 11/04/2021: Patient presents today due to a painful, burning sensation at the end of urination. Also c/o bloating. Admits some urinary urgency and double voiding this past week. She is no longer on duloxetine as it did not help. She was given Fluconazole by her other doctor, however she discontinued that as it also did not help her symptoms. Prior to this episode, she had been doing very well with just some stress incontinence when she sneezes.\par \par 11/11/2021: Patient presents today for a follow up telephone visit for which she gave permission for. She has been taking tamsulosin 0.4 mg once daily after a meal and oxybutynin ER 10 mg once daily for urinary stress incontinence. Additionally she was put on Nitrofurantoin 100 mg BID for ten days due to her complaints of burning at the end of urination, bloating, and double voiding. UA from 11/4/2021 showed a high specific gravity and a few calcium oxalate crystals. Urine cytology was benign. Urine culture was no significant growth. Today, she states that she is doing about the same with very little improvement. Her discomfort and burning after urination is just about the same and she still feels bloated. Denies urinary urgency. Pt has a +SHx of partial hysterectomy due to bleeding of the fibroids. Still has both ovaries. Gets occasional hot flashes. \par \par 11/23/2021: Patient presents today for cystoscopy for hx of incomplete bladder emptying. Continues Tamsulosin 0.4mg once daily and Oxybutynin ER 10mg. However, feels she is having heartburn although unsure which medication it is. Finished course of Nitrofurantoin. Does not have burning after urination today. States her burning pain stopped 4 days ago. Sometimes has to strain to urinate, but overall feels well and better than before. \par \par 11/17/2022: Ms. JA CHARLES presents today for an audio visual telehealth visit for which she gave permission for. About two weeks ago, the patient thought she might have the start of a UTI. She has been having some discomfort on urination and pressure. Has been taking tamsulosin 0.4 mg once daily and oxybutynin ER 10 mg once daily. Denies constipation. Admits some dry mouth. Provided a urine specimen and blood work this morning at a [facility] lab. \par

## 2022-11-29 NOTE — ASSESSMENT
[FreeTextEntry1] : Mrs Charles is a 47 year old female presented for follow of incomplete bladder emptying and UTI. The patient takes tamsulosin 0.4 mg one half hour after meal twice daily. The patient continued to take nitrofurantoin macrocrystal 100 mg, just prior to or after sexual intercourse to prevent UTI. She reported she has not had any recent UTI. The patient has not had any gross hematuria. She reported to having rare episodes of dysuria. The patient wakes 1-2 times nightly to urinate. She reported that she is emptying better now with medication than in the past. \par 9/13/18: The patient returned today and reported she had an UTI recently and was prescribed levofloxacin. Complained of pelvic pain when sitting for long periods of time. Complained of pain inside the vagina, feels like 'the feeling you get right before you push a baby out during labor.' Denied vaginal discharge. Noted her UTIs may no be related to sex. Takes nitrofurantoin before sex. Currently taking tamsulosin twice daily. Denied chance of pregnancy. \par 10/01/18: The patient returned and reported her dysuria is resolving. Complained of mild headaches after starting Doxycycline. Had 3 days left for treatment. Denied vaginal itching. \par \par 2/12/2019 Laparoscopic supracervical hysterectomy and bilateral salpingectomy and cystoscopy.\par \par 4/2/19: The patient returned and reported that she has had dysuria. Noted that a urine culture on 3/20/19 was negative. Has light discharge. Started taking AZO one day ago, which hasn't helped her burning. Currently taking Tamsulosin. Patient denied gross hematuria, urgency, or incontinence. 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 Noted that after the surgery, the patient had a UTI. Patient took Amoxicillin and three days worth of Fluconazole. Patient's dysuria started after the surgery, but still didn't resolve after finishing her trial of antibiotics.\par 4/16/19: The patient returned and reported that her symptoms haven't improved since taking Fluconazole. Finished the trial four days ago. The burning occurs after urination. Denied burning during urination. Takes Tamsulosin BID. Complained of lower lumbar pain bilaterally especially paraspinous, but no spine pain. Denied tobacco use.\par 4/30/19: The patient returned today and reported that taking Oxybutynin ER 5 mg has improved the burning after urination by 70%. The pain used to be a severity of 10/10 and now it is 3/10. Has slight dry mouth. Denies constipation. Also taking Tamsulosin BID.\par 5/21/19: Female patient presents today for a follow up. Since her last visit she reports that her current medical regimen, which includes Tamsulosin 0.4 mg and Oxybutynin 10 mg continue to provide relief from her urinary symptoms. On occasion she still notes that there is a slight uncomfortable feeling during urination. Dry mouth is reported as a result of taking Oxybutynin and notes that it was less on the lower dose. She denies hematuria, urgency, burning during urination, and frequency. She wakes up once a night to urinate. Occasional intermittency is reported. \par 9/26/19: Patient presents today for a follow up. Today she states that a UTI occurred since her last visit. She had Nitrofurantoin at home and began taking one tablet BID once the symptoms began. She took it at that dose for 5 days and no relief was experienced. On the sixth day she began taking two tablets BID and that dosage relieved her symptoms. She called and spoke with a nurse to see if that dosage was a proper dose but does not feel as if her questions were understood. Oxybutynin is continued as directed with benefit. The patient produced a urine sample which will be sent for urinalysis, urine cytology and urine culture. I have advised that she call back in 3 days to discuss the results of the urine culture. The prescribed antibiotic will be changed appropriately. No changes are made to her current medical regimen. She is to continue taking Oxybutynin and Tamsulosin as directed. If her symptoms resolve she can follow up in 3 months. If she continues to be symptomatic than I have advised that she return in 3 weeks for another evaluation. \par 06/12/2020: Pt contacted at [phone]. Gave permission to conduct a Telephone visit. Pt's pain persists. Fluconazole 100mg is working, but does not always. PT took Desipramine, but has stopped it. She has severe lower back pain. Urine on 06/01/2020 showed no abnormal results. She is experiencing vaginal swelling. Has had a partial hysterectomy. Ovaries are still intact. Advised to take 2 tablets of Desipramine qhs. Continue Tamsulosin and finish off Fluconazole. Pt will come for an office visit in 3 weeks. Preparation, visit and coordination of care took 11 minutes. \par \par 07/07/2020: Patient presents today for a follow up. Pt did well on Fluconazole. Pt had more burning with Desipramine. She has some stress incontinence and she is now wearing pads. Pt gets left sided flank pain. There is no additional pain with pressure on the area. Pt has disc herniation on the lower left side. Pt is still on Tamsulosin once daily after dinner. She feel she's emptying fine, but there is discomfort and leaking afterwards. Pt is on Oxybutynin every morning. \par \par 11/04/2021: Patient presents today due to a painful, burning sensation at the end of urination. Also c/o bloating. Admits some urinary urgency and double voiding this past week. She is no longer on duloxetine as it did not help. She was given Fluconazole by her other doctor, however she discontinued that as it also did not help her symptoms. Prior to this episode, she had been doing very well with just some stress incontinence when she sneezes. \par \par 11/11/2021: Patient presents today for a follow up telephone visit for which she gave permission for. She has been taking tamsulosin 0.4 mg once daily after a meal and oxybutynin ER 10 mg once daily for urinary stress incontinence. Additionally she was put on Nitrofurantoin 100 mg BID for ten days due to her complaints of burning at the end of urination, bloating, and double voiding. UA from 11/4/2021 showed a high specific gravity and a few calcium oxalate crystals. Urine cytology was benign. Urine culture was no significant growth. Today, she states that she is doing about the same with very little improvement. Her discomfort and burning after urination is just about the same and she still feels bloated. Denies urinary urgency. Pt has a +SHx of partial hysterectomy due to bleeding of the fibroids. Still has both ovaries. Gets occasional hot flashes. \par \par 11/23/2021: Patient presents today for cystoscopy for hx of incomplete bladder emptying. Continues Tamsulosin 0.4mg once daily and Oxybutynin ER 10mg. However, feels she is having heartburn although unsure which medication it is. Finished course of Nitrofurantoin. Does not have burning after urination today. States her burning pain stopped 4 days ago. Sometimes has to strain to urinate, but overall feels well and better than before. \par \par Cystoscopy findings: Urethral meatus was small but no stricture. Dilated a short amount with blue cone dilator which was successful after adding additional 2% lidocaine jelly and cystoscopy was passed. Bladder mucosa is normal pale pink. Right ureteral orifice is a slit. Left ureteral orifice is a slit. Both orifices have normal positions on trigone. Normal inferior bladder. Bladder is 1+ trabeculated. No bladder tumors or stones. No areas of inflammation. Inflammatory polyp at 1 o'clock at bladder neck. No urethral strictures, tumors, stones, or inflammatory patches. \par \par Two tablets of Bactrim are provided. Patient is to take one tablet now and one tablet tonight to limit their risk of infection. \par It is likely Oxybutynin causing heartburn, so I have prescribed Famotidine to take to prevent heartburn. Continue Tamsulosin 0.4mg once daily and Oxybutynin ER 10mg once daily. \par The patient produced a urine sample which will be sent for urinalysis, urine cytology, and urine culture. \par Blood work today includes BMP. \par RTO in 3 months for follow up or sooner if new urinary symptoms develop. \par \par 11/17/2022: Ms. JA CHARLES presents today for an audio visual telehealth visit for which she gave permission for. About two weeks ago, the patient thought she might have the start of a UTI. She has been having some discomfort on urination and pressure. Has been taking tamsulosin 0.4 mg once daily and oxybutynin ER 10 mg once daily. Denies constipation. Admits some dry mouth. Provided a urine specimen and blood work this morning at a [facility] lab. \par \par Patient will continue tamsulosin 0.4 mg once daily. I am increasing the patient's dose of oxybutynin ER from 10 mg to 15 mg. If her mouth becomes too dry she will go back to 10 mg. In the meantime, I await her urine test results. \par \par Patient will have a telehealth visit in two weeks for reassessment, sooner if clinically indicated. \par \par Preparation, audio-visual visit, and coordination of care took : 30 minutes.

## 2023-01-24 RX ORDER — OXYBUTYNIN CHLORIDE 15 MG/1
15 TABLET, EXTENDED RELEASE ORAL
Qty: 90 | Refills: 3 | Status: ACTIVE | COMMUNITY
Start: 2022-11-17 | End: 1900-01-01

## 2023-03-11 NOTE — ED PROVIDER NOTE - [facility] ED [facility] ALL NEG
CXR shows right pleural effusion, previous thoracentesis in 12/21 with recurrent effusion. Etiology likely HFpEF exacerbation vs malignant     - pleurx attached to chest tube negative...

## 2023-03-30 ENCOUNTER — APPOINTMENT (OUTPATIENT)
Dept: UROLOGY | Facility: CLINIC | Age: 48
End: 2023-03-30
Payer: COMMERCIAL

## 2023-03-30 DIAGNOSIS — R89.6 ABNORMAL CYTOLOGICAL FINDINGS IN SPECIMENS FROM OTHER ORGANS, SYSTEMS AND TISSUES: ICD-10-CM

## 2023-03-30 DIAGNOSIS — N39.3 STRESS INCONTINENCE (FEMALE) (MALE): ICD-10-CM

## 2023-03-30 DIAGNOSIS — R35.0 FREQUENCY OF MICTURITION: ICD-10-CM

## 2023-03-30 DIAGNOSIS — R31.29 OTHER MICROSCOPIC HEMATURIA: ICD-10-CM

## 2023-03-30 DIAGNOSIS — N39.0 URINARY TRACT INFECTION, SITE NOT SPECIFIED: ICD-10-CM

## 2023-03-30 DIAGNOSIS — R33.9 RETENTION OF URINE, UNSPECIFIED: ICD-10-CM

## 2023-03-30 DIAGNOSIS — R39.89 OTHER SYMPTOMS AND SIGNS INVOLVING THE GENITOURINARY SYSTEM: ICD-10-CM

## 2023-03-30 DIAGNOSIS — R30.0 DYSURIA: ICD-10-CM

## 2023-03-30 PROCEDURE — 99214 OFFICE O/P EST MOD 30 MIN: CPT | Mod: 95

## 2023-03-30 RX ORDER — CEFADROXIL 500 MG/1
500 CAPSULE ORAL TWICE DAILY
Qty: 20 | Refills: 0 | Status: ACTIVE | COMMUNITY
Start: 2023-03-30 | End: 1900-01-01

## 2023-03-30 NOTE — ASSESSMENT
[FreeTextEntry1] : Mrs Charles is a 48 year old female presented for follow of incomplete bladder emptying and UTI. The patient takes tamsulosin 0.4 mg one half hour after meal twice daily. The patient continued to take nitrofurantoin macrocrystal 100 mg, just prior to or after sexual intercourse to prevent UTI. She reported she has not had any recent UTI. The patient has not had any gross hematuria. She reported to having rare episodes of dysuria. The patient wakes 1-2 times nightly to urinate. She reported that she is emptying better now with medication than in the past. \par 9/13/18: The patient returned today and reported she had an UTI recently and was prescribed levofloxacin. Complained of pelvic pain when sitting for long periods of time. Complained of pain inside the vagina, feels like 'the feeling you get right before you push a baby out during labor.' Denied vaginal discharge. Noted her UTIs may no be related to sex. Takes nitrofurantoin before sex. Currently taking tamsulosin twice daily. Denied chance of pregnancy. \par 10/01/18: The patient returned and reported her dysuria is resolving. Complained of mild headaches after starting Doxycycline. Had 3 days left for treatment. Denied vaginal itching. \par \par 2/12/2019 Laparoscopic supracervical hysterectomy and bilateral salpingectomy and cystoscopy.\par \par 4/2/19: The patient returned and reported that she has had dysuria. Noted that a urine culture on 3/20/19 was negative. Has light discharge. Started taking AZO one day ago, which hasn't helped her burning. Currently taking Tamsulosin. Patient denied gross hematuria, urgency, or incontinence. 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 Noted that after the surgery, the patient had a UTI. Patient took Amoxicillin and three days worth of Fluconazole. Patient's dysuria started after the surgery, but still didn't resolve after finishing her trial of antibiotics.\par 4/16/19: The patient returned and reported that her symptoms haven't improved since taking Fluconazole. Finished the trial four days ago. The burning occurs after urination. Denied burning during urination. Takes Tamsulosin BID. Complained of lower lumbar pain bilaterally especially paraspinous, but no spine pain. Denied tobacco use.\par 4/30/19: The patient returned today and reported that taking Oxybutynin ER 5 mg has improved the burning after urination by 70%. The pain used to be a severity of 10/10 and now it is 3/10. Has slight dry mouth. Denies constipation. Also taking Tamsulosin BID.\par 5/21/19: Female patient presents today for a follow up. Since her last visit she reports that her current medical regimen, which includes Tamsulosin 0.4 mg and Oxybutynin 10 mg continue to provide relief from her urinary symptoms. On occasion she still notes that there is a slight uncomfortable feeling during urination. Dry mouth is reported as a result of taking Oxybutynin and notes that it was less on the lower dose. She denies hematuria, urgency, burning during urination, and frequency. She wakes up once a night to urinate. Occasional intermittency is reported. \par 9/26/19: Patient presents today for a follow up. Today she states that a UTI occurred since her last visit. She had Nitrofurantoin at home and began taking one tablet BID once the symptoms began. She took it at that dose for 5 days and no relief was experienced. On the sixth day she began taking two tablets BID and that dosage relieved her symptoms. She called and spoke with a nurse to see if that dosage was a proper dose but does not feel as if her questions were understood. Oxybutynin is continued as directed with benefit. The patient produced a urine sample which will be sent for urinalysis, urine cytology and urine culture. I have advised that she call back in 3 days to discuss the results of the urine culture. The prescribed antibiotic will be changed appropriately. No changes are made to her current medical regimen. She is to continue taking Oxybutynin and Tamsulosin as directed. If her symptoms resolve she can follow up in 3 months. If she continues to be symptomatic than I have advised that she return in 3 weeks for another evaluation. \par 06/12/2020: Pt contacted at [phone]. Gave permission to conduct a Telephone visit. Pt's pain persists. Fluconazole 100mg is working, but does not always. PT took Desipramine, but has stopped it. She has severe lower back pain. Urine on 06/01/2020 showed no abnormal results. She is experiencing vaginal swelling. Has had a partial hysterectomy. Ovaries are still intact. Advised to take 2 tablets of Desipramine qhs. Continue Tamsulosin and finish off Fluconazole. Pt will come for an office visit in 3 weeks. Preparation, visit and coordination of care took 11 minutes. \par \par 07/07/2020: Patient presents today for a follow up. Pt did well on Fluconazole. Pt had more burning with Desipramine. She has some stress incontinence and she is now wearing pads. Pt gets left sided flank pain. There is no additional pain with pressure on the area. Pt has disc herniation on the lower left side. Pt is still on Tamsulosin once daily after dinner. She feel she's emptying fine, but there is discomfort and leaking afterwards. Pt is on Oxybutynin every morning. \par \par 11/04/2021: Patient presents today due to a painful, burning sensation at the end of urination. Also c/o bloating. Admits some urinary urgency and double voiding this past week. She is no longer on duloxetine as it did not help. She was given Fluconazole by her other doctor, however she discontinued that as it also did not help her symptoms. Prior to this episode, she had been doing very well with just some stress incontinence when she sneezes. \par \par 11/11/2021: Patient presents today for a follow up telephone visit for which she gave permission for. She has been taking tamsulosin 0.4 mg once daily after a meal and oxybutynin ER 10 mg once daily for urinary stress incontinence. Additionally she was put on Nitrofurantoin 100 mg BID for ten days due to her complaints of burning at the end of urination, bloating, and double voiding. UA from 11/4/2021 showed a high specific gravity and a few calcium oxalate crystals. Urine cytology was benign. Urine culture was no significant growth. Today, she states that she is doing about the same with very little improvement. Her discomfort and burning after urination is just about the same and she still feels bloated. Denies urinary urgency. Pt has a +SHx of partial hysterectomy due to bleeding of the fibroids. Still has both ovaries. Gets occasional hot flashes. \par \par 11/23/2021: Patient presents today for cystoscopy for hx of incomplete bladder emptying. Continues Tamsulosin 0.4mg once daily and Oxybutynin ER 10mg. However, feels she is having heartburn although unsure which medication it is. Finished course of Nitrofurantoin. Does not have burning after urination today. States her burning pain stopped 4 days ago. Sometimes has to strain to urinate, but overall feels well and better than before. \par \par Cystoscopy findings: Urethral meatus was small but no stricture. Dilated a short amount with blue cone dilator which was successful after adding additional 2% lidocaine jelly and cystoscopy was passed. Bladder mucosa is normal pale pink. Right ureteral orifice is a slit. Left ureteral orifice is a slit. Both orifices have normal positions on trigone. Normal inferior bladder. Bladder is 1+ trabeculated. No bladder tumors or stones. No areas of inflammation. Inflammatory polyp at 1 o'clock at bladder neck. No urethral strictures, tumors, stones, or inflammatory patches. \par \par Two tablets of Bactrim are provided. Patient is to take one tablet now and one tablet tonight to limit their risk of infection. \par It is likely Oxybutynin causing heartburn, so I have prescribed Famotidine to take to prevent heartburn. Continue Tamsulosin 0.4mg once daily and Oxybutynin ER 10mg once daily. \par The patient produced a urine sample which will be sent for urinalysis, urine cytology, and urine culture. \par Blood work today includes BMP. \par RTO in 3 months for follow up or sooner if new urinary symptoms develop. \par \par 11/17/2022: Ms. JA CHARLES presents today for an audio visual telehealth visit for which she gave permission for. About two weeks ago, the patient thought she might have the start of a UTI. She has been having some discomfort on urination and pressure. Has been taking tamsulosin 0.4 mg once daily and oxybutynin ER 10 mg once daily. Denies constipation. Admits some dry mouth. Provided a urine specimen and blood work this morning at a [facility] lab. \par \par Patient will continue tamsulosin 0.4 mg once daily. I am increasing the patient's dose of oxybutynin ER from 10 mg to 15 mg. If her mouth becomes too dry she will go back to 10 mg. In the meantime, I await her urine test results. \par Patient will have a telehealth visit in two weeks for reassessment, sooner if clinically indicated. \par \par 03/30/2023: Ms. JA CHARLES presents today for an audio visual telehealth visit for which she gave permission for. The patient was located at home at [address] [place] [address] and I was located at my office in [place], [place]. The patient called today and c/o of dysuria which started 3 days ago. She has been having a lot of burning.. She said when she urinates she is only voiding small amounts but denies suprapubic discomfort/pressure. C/o incomplete emptying. She said she had a UTI a month ago and took antibiotics for 7 days (she could not recall the name of the medication) and then left the country. She said she did not think that UTI ever resolved. H/o incomplete bladder emptying and UTIs. No hematuria, nausea, vomiting, fever, chills. She is taking Azo for symptom management. \par \par The patient provided a urine sample today at the [facility] lab will be sent for urinalysis, urine cytology, and urine culture. \par \par I prescribed the patient Cefadroxil 500 mg BID x 10 days. Pt will provide proof of cure urine specimen 3-4 days after finishing abx. \par  \par Patient will call on Monday for urine culture results. I will be away and advised her to speak with NP Anjelica Cortez to discuss if she needs an abx change. If she cannot get in touch with Anjelica she can speak to my nurse Soumya Norris or Dr.Jane Gregory. I sent a request to my colleagues in the office to check the urine culture. \par \par Preparation, audio-visual visit, and coordination of care took : 30 minutes.

## 2023-03-30 NOTE — HISTORY OF PRESENT ILLNESS
[FreeTextEntry1] : Mrs Charles is a 48 year old female presented for follow of incomplete bladder emptying and UTI. The patient takes tamsulosin 0.4 mg one half hour after meal twice daily. The patient continued to take nitrofurantoin macrocrystal 100 mg, just prior to or after sexual intercourse to prevent UTI. She reported she has not had any recent UTI. The patient has not had any gross hematuria. She reported to having rare episodes of dysuria. The patient wakes 1-2 times nightly to urinate. She reported that she is emptying better now with medication than in the past. \par 9/13/18: The patient returned today and reported she had an UTI recently and was prescribed levofloxacin. Complained of pelvic pain when sitting for long periods of time. Complained of pain inside the vagina, feels like 'the feeling you get right before you push a baby out during labor.' Denied vaginal discharge. Noted her UTIs may no be related to sex. Takes nitrofurantoin before sex. Currently taking tamsulosin twice daily. Denied chance of pregnancy. \par 10/01/18: The patient returned and reported her dysuria is resolving. Complained of mild headaches after starting Doxycycline. Had 3 days left for treatment. Denied vaginal itching. \par \par February 12, 2019 Laparoscopic supracervical hysterectomy and bilateral salpingectomy and cystoscopy.\par \par 4/2/19: The patient returned and reported that she has had dysuria. Noted that a urine culture on 3/20/19 was negative. Has light discharge. Started taking AZO one day ago, which hasn't helped her burning. Currently taking Tamsulosin. Patient denied gross hematuria, urgency, or incontinence. 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 Noted that after the surgery, the patient had a UTI. Patient took Amoxicillin and three days worth of Fluconazole. Patient's dysuria started after the surgery, but still didn't resolve after finishing her trial of antibiotics.\par 4/16/19: The patient returned and reported that her symptoms haven't improved since taking Fluconazole. Finished the trial four days ago. The burning occurs after urination. Denied burning during urination. Takes Tamsulosin BID. Complained of lower lumbar pain bilaterally especially paraspinous, but no spine pain. Denied tobacco use.\par 4/30/19: The patient returned today and reported that taking Oxybutynin ER 5 mg has improved the burning after urination by 70%. The pain used to be a severity of 10/10 and now it is 3/10. Has slight dry mouth. Denies constipation. Also taking Tamsulosin BID.\par 5/21/19: Female patient presents today for a follow up. Since her last visit she reports that her current medical regimen, which includes Tamsulosin 0.4 mg and Oxybutynin 10 mg continue to provide relief from her urinary symptoms. On occasion she still notes that there is a slight uncomfortable feeling during urination. Dry mouth is reported as a result of taking Oxybutynin and notes that it was less on the lower dose. She denies hematuria, urgency, burning during urination, and frequency. She wakes up once a night to urinate. Occasional intermittency is reported. \par 9/26/19: Patient presents today for a follow up. Today she states that a UTI occurred since her last visit. She had Nitrofurantoin at home and began taking one tablet BID once the symptoms began. She took it at that dose for 5 days and no relief was experienced. On the sixth day she began taking two tablets BID and that dosage relieved her symptoms. She called and spoke with a nurse to see if that dosage was a proper dose but does not feel as if her questions were understood. Oxybutynin is continued as directed with benefit. \par 06/12/2020: Pt contacted at [phone]. Gave permission to conduct a Telephone visit. Pt's pain persists. Fluconazole 100mg is working, but does not always. PT took Desipramine, but has stopped it. She has severe lower back pain. Urine on 06/01/2020 showed no abnormal results. She is experiencing vaginal swelling. Has had a partial hysterectomy. Ovaries are still intact.\par \par 07/07/2020: Patient presents today for a follow up. Pt did well on Fluconazole. Pt had more burning with Desipramine. She has some stress incontinence and she is now wearing pads. Pt gets left sided flank pain. There is no additional pain with pressure on the area. Pt has disc herniation on the lower left side. Pt is still on Tamsulosin once daily after dinner. She feel she's emptying fine, but there is discomfort and leaking afterwards. Pt is on Oxybutynin every morning. \par \par 11/04/2021: Patient presents today due to a painful, burning sensation at the end of urination. Also c/o bloating. Admits some urinary urgency and double voiding this past week. She is no longer on duloxetine as it did not help. She was given Fluconazole by her other doctor, however she discontinued that as it also did not help her symptoms. Prior to this episode, she had been doing very well with just some stress incontinence when she sneezes.\par \par 11/11/2021: Patient presents today for a follow up telephone visit for which she gave permission for. She has been taking tamsulosin 0.4 mg once daily after a meal and oxybutynin ER 10 mg once daily for urinary stress incontinence. Additionally she was put on Nitrofurantoin 100 mg BID for ten days due to her complaints of burning at the end of urination, bloating, and double voiding. UA from 11/4/2021 showed a high specific gravity and a few calcium oxalate crystals. Urine cytology was benign. Urine culture was no significant growth. Today, she states that she is doing about the same with very little improvement. Her discomfort and burning after urination is just about the same and she still feels bloated. Denies urinary urgency. Pt has a +SHx of partial hysterectomy due to bleeding of the fibroids. Still has both ovaries. Gets occasional hot flashes. \par \par 11/23/2021: Patient presents today for cystoscopy for hx of incomplete bladder emptying. Continues Tamsulosin 0.4mg once daily and Oxybutynin ER 10mg. However, feels she is having heartburn although unsure which medication it is. Finished course of Nitrofurantoin. Does not have burning after urination today. States her burning pain stopped 4 days ago. Sometimes has to strain to urinate, but overall feels well and better than before. \par \par 11/17/2022: Ms. JA CHARLES presents today for an audio visual telehealth visit for which she gave permission for. About two weeks ago, the patient thought she might have the start of a UTI. She has been having some discomfort on urination and pressure. Has been taking tamsulosin 0.4 mg once daily and oxybutynin ER 10 mg once daily. Denies constipation. Admits some dry mouth. Provided a urine specimen and blood work this morning at a [facility] lab. \par \par 03/30/2023: Ms. JA CHARLES presents today for an audio visual telehealth visit for which she gave permission for. The patient was located at home at [address] [place] [address] and I was located at my office in [place], [place]. The patient called today and c/o of dysuria which started 3 days ago. She has been having a lot of burning.. She said when she urinates she is only voiding small amounts but denies suprapubic discomfort/pressure. C/o incomplete emptying. She said she had a UTI a month ago and took antibiotics for 7 days (she could not recall the name of the medication) and then left the country. She said she did not think that UTI ever resolved. H/o incomplete bladder emptying and UTIs. No hematuria, nausea, vomiting, fever, chills. She is taking Azo for symptom management.

## 2023-03-30 NOTE — ADDENDUM
[FreeTextEntry1] : This note was authored by Henna Everett working as a scribe for Dr.Gary Baker. I, Dr. Danny Baker have reviewed the content of this note and confirm it is true and accurate. I personally performed the history and physical examination and made all the decisions 03/30/2023

## 2023-03-30 NOTE — REVIEW OF SYSTEMS
[Burning Sensation] : burning sensation during urination [Hot Flashes] : hot flashes [Negative] : Heme/Lymph [Dysuria] : dysuria [Incomplete Emptying] : incomplete emptying of bladder

## 2023-04-03 ENCOUNTER — NON-APPOINTMENT (OUTPATIENT)
Age: 48
End: 2023-04-03

## 2023-04-03 LAB
APPEARANCE: CLEAR
BACTERIA UR CULT: NORMAL
BACTERIA: NEGATIVE
BILIRUBIN URINE: NEGATIVE
BLOOD URINE: ABNORMAL
COLOR: YELLOW
GLUCOSE QUALITATIVE U: NEGATIVE
HYALINE CASTS: 0 /LPF
KETONES URINE: NEGATIVE
LEUKOCYTE ESTERASE URINE: ABNORMAL
MICROSCOPIC-UA: NORMAL
NITRITE URINE: POSITIVE
PH URINE: 7
PROTEIN URINE: NEGATIVE
RED BLOOD CELLS URINE: 1 /HPF
SPECIFIC GRAVITY URINE: 1.01
SQUAMOUS EPITHELIAL CELLS: 3 /HPF
UROBILINOGEN URINE: NORMAL
WHITE BLOOD CELLS URINE: 35 /HPF

## 2023-07-30 NOTE — HISTORY OF PRESENT ILLNESS
[FreeTextEntry1] : Mrs Thomas is a 44 year old female presented for follow of incomplete bladder emptying and UTI. The patient takes tamsulosin 0.4 mg one half hour after meal twice daily. The patient continued to take nitrofurantoin macrocrystal 100 mg, just prior to or after sexual intercourse to prevent UTI. She reported she has not had any recent UTI. The patient has not had any gross hematuria. She reported to having rare episodes of dysuria. The patient wakes 1-2 times nightly to urinate. She reported that she is emptying better now with medication than in the past. \par 9/13/18: The patient returned today and reported she had an UTI recently and was prescribed levofloxacin. Complained of pelvic pain when sitting for long periods of time. Complained of pain inside the vagina, feels like 'the feeling you get right before you push a baby out during labor.' Denied vaginal discharge. Noted her UTIs may no be related to sex. Takes nitrofurantoin before sex. Currently taking tamsulosin twice daily. Denied chance of pregnancy. \par 10/01/18: The patient returned and reported her dysuria is resolving. Complained of mild headaches after starting Doxycycline. Had 3 days left for treatment. Denied vaginal itching. \par 4/2/19: The patient returned and reported that she has had dysuria. Noted that a urine culture on 3/20/19 was negative. Has light discharge. Started taking AZO one day ago, which hasn't helped her burning. Currently taking Tamsulosin. Patient denied gross hematuria, urgency, or incontinence. 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 Noted that after the surgery, the patient had a UTI. Patient took Amoxicillin and three days worth of Fluconazole. Patient's dysuria started after the surgery, but still didn't resolve after finishing her trial of antibiotics.\par 4/16/19: The patient returned and reported that her symptoms haven't improved since taking Fluconazole. Finished the trial four days ago. The burning occurs after urination. Denied burning during urination. Takes Tamsulosin BID. Complained of lower lumbar pain bilaterally especially paraspinous, but no spine pain. Denied tobacco use.\par 4/30/19: The patient returned today and reported that taking Oxybutynin ER 5 mg has improved the burning after urination by 70%. The pain used to be a severity of 10/10 and now it is 3/10. Has slight dry mouth. Denies constipation. Also taking Tamsulosin BID.\par \par 5/21/19: Female patient presents today for a follow up. Since her last visit she reports that her current medical regimen, which includes Tamsulosin 0.4 mg and Oxybutynin 10 mg continue to provide relief from her urinary symptoms. On occasion she still notes that there is a slight uncomfortable feeling during urination. Dry mouth is reported as a result of taking Oxybutynin and notes that it was less on the lower dose. She denies hematuria, urgency, burning during urination, and frequency. She wakes up once a night to urinate. Occasional intermittency is reported.  [facility]

## 2024-07-23 NOTE — ASSESSMENT
[FreeTextEntry1] : Mrs Thomas is a 44 year old female presented for follow of incomplete bladder emptying and UTI. The patient takes tamsulosin 0.4 mg one half hour after meal twice daily. The patient continued to take nitrofurantoin macrocrystal 100 mg, just prior to or after sexual intercourse to prevent UTI. She reported she has not had any recent UTI. The patient has not had any gross hematuria. She reported to having rare episodes of dysuria. The patient wakes 1-2 times nightly to urinate. She reported that she is emptying better now with medication than in the past. \par 9/13/18: The patient returned today and reported she had an UTI recently and was prescribed levofloxacin. Complained of pelvic pain when sitting for long periods of time. Complained of pain inside the vagina, feels like 'the feeling you get right before you push a baby out during labor.' Denied vaginal discharge. Noted her UTIs may no be related to sex. Takes nitrofurantoin before sex. Currently taking tamsulosin twice daily. Denied chance of pregnancy. \par 10/01/18: The patient returned and reported her dysuria is resolving. Complained of mild headaches after starting Doxycycline. Had 3 days left for treatment. Denied vaginal itching. \par 4/2/19: The patient returned and reported that she has had dysuria. Noted that a urine culture on 3/20/19 was negative. Has light discharge. Started taking AZO one day ago, which hasn't helped her burning. Currently taking Tamsulosin. Patient denied gross hematuria, urgency, or incontinence. 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 Noted that after the surgery, the patient had a UTI. Patient took Amoxicillin and three days worth of Fluconazole. Patient's dysuria started after the surgery, but still didn't resolve after finishing her trial of antibiotics.\par 4/16/19: The patient returned and reported that her symptoms haven't improved since taking Fluconazole. Finished the trial four days ago. The burning occurs after urination. Denied burning during urination. Takes Tamsulosin BID. Complained of lower lumbar pain bilaterally especially paraspinous, but no spine pain. Denied tobacco use.\par \par 4/30/19: The patient returned today and reported that taking Oxybutynin ER 5 mg has improved the burning after urination by 70%. The pain used to be a severity of 10/10 and now it is 3/10. Has slight dry mouth. Denies constipation. Also taking Tamsulosin BID.\par \par I increased the dosage of Oxybutynin ER from 5 mg to 10 mg once daily. \par Along with taking Tamsulosin BID, I renewed the patient Oxybutynin ER 10 mg, one tablet daily.\par \par The patient will return in 3 weeks for a follow up. 
negative

## 2025-05-06 NOTE — H&P PST ADULT - SKIN/BREAST
PLEASE READ THE FOLLOWING INSTRUCTIONS CAREFULLY, IN ADDITION TO THE ABOVE:    Please monitor your blood sugar regularly. If you are on insulin treatment, you should be monitoring your blood sugar at least 4 times daily (before meals and at bedtime). It would be helpful if you maintain a log of your readings as well as a food diary, and bring it for review at your next diabetes clinic visit. A blood glucose log is not necessary if you use a continuous glucose monitor regularly.    PLEASE REMEMBER TO BRING YOUR GLUCOMETER OR CGM  (DEXCOM OR FREESTYLE VENUS) TO ALL YOUR FUTURE VISITS TO THE DIABETES CLINIC    Please make sure you follow up with the diabetes educator as scheduled. Diabetes care provision is a team effort and your visit with the diabetes educator is equally important for good control of your diabetes as your visit with the doctor.      If your doctor recommended lab tests today, make sure they are performed at the time you have been advised. Results of routine labs are usually discussed at the time of the next office visit. All lab results are reviewed soon after they are reported, but you may only receive a call from our office if those results are abnormal and need to be discussed urgently.     Some labs may need to be performed in the morning in the fasting state, so please confirm the recommended timing before heading to the lab.    If you are having frequent low blood sugar events (glucose < 80 mg/dl), or high blood sugar events (glucose > 250 mg/dl) please call our office. Your diabetes medication regimen will likely need adjustment     If your doctor has recommended you to self-adjust your insulin doses, following is a general guide on how to do so. If you have questions, do not hesitate to call our office:    Basal (long-acting) insulin    If you see morning (fasting) blood sugars less than 80 on repeated occasions (twice or more), reduce your basal insulin dose by approximately 10%      If you see no improvement for 2 days and your morning sugars are still lower than 80, reduce your basal insulin dose by another 10 %    Continue to reduce your basal insulin dose until all your morning sugars are > 80    If you see morning (fasting) blood sugars persistently greater than 150 (3 or more occasions), increase your basal insulin dose by approximately 10%   After making the change, if your morning blood sugars stay greater than 150 persistently, increase your basal insulin dose by another 10%  Continue to increase the dose of basal insulin until you see that your fasting blood sugars are < 150 on most days     Meal time (short-acting) insulin    If your pre-lunch blood sugar is high (greater than 140) consistently, and you have been having your normal sized breakfast, you should increase the dose of your insulin dose with breakfast by approximately 10%     If your pre-dinner blood sugar is high (greater than 140) consistently, and you have been having your normal sized lunch, you should increase the dose of your lunch insulin dose by approximately 10%     If you are a patient who has been prescribed GLP-1 analog treatment (which includes Ozempic, Rybelsus, Trulicity, Mounjaro, Victoza) and you are having difficulties obtaining that medication from your pharmacy due to the current GLP-1 drug shortage, please read the following prior to calling our office for questions:    If you have been experiencing difficulties obtaining your Ozempic (injectable semaglutide) from the pharmacy, please consider the following options:    Please contact alternate pharmacies in your area to inquire if they have your current dose of Ozempic available. If it is available, please request the pharmacist to reserve or hold at least a month's supply for you (usually only an option if the patient calls) and send a message to our office afterwards to have your prescription routed to that pharmacy. We will try to address that  request as soon as it is possible.  If your current Ozempic dose is not available at pharmacies around you, please inquire if a lower or higher dose is available. If yes, request the pharmacist to hold it for you, and call our office for recommendation regarding dose titration.  Glucose control is not significantly affected if you miss 1-2 weekly doses of Ozempic. If you do not expect more than a 1-2-week delay in receiving your supply from the pharmacy, please continue to monitor your sugars as recommended while off Ozempic  If all the above measures have been exhausted, switching to an alternate treatment can be considered. Rybelsus is oral semaglutide available in 3 different dose options. This is taken daily, 30 minutes before breakfast on an empty stomach. If Ozempic is working effectively in controlling your glucose level, then it is best to consider temporarily switching to this oral form of the drug.  Side effect profile is very similar to Ozempic. Please inform us if you are interested in learning more about this option. You may be asked to schedule an office visit prior to making this switch for a more detailed discussion regarding dosage and side effects.  If your insurance covers an alternate medication from the GLP-1 class of medications (which include Ozempic Trulicity, Mounjaro, Victoza), switching to a different medication from this class could be an option. Potential side effects may differ from Ozempic. You would likely need to start any of these alternate treatment options at a lower dose and titrate up as needed. It may require you to make a phone call to your insurance company to find out regarding coverage. This would also likely require an office visit to discuss dose titration and side effect profile.  If your current diabetes treatment regimen includes medications in addition to Ozempic, like insulin, doses can be adjusted up while you are unable to continue with Ozempic     Please  note:    If you have been off Ozempic for more than 2 weeks, chances are you may need to resume Ozempic at a lower dose to avoid experiencing gastrointestinal side effects.    Hypoglycemia    Low Blood Sugar (Hypoglycemia)   Having too little sugar (glucose) in your blood is called low blood sugar (hypoglycemia). Low blood sugar often means anything lower than 70 mg/dL. Talk with your healthcare provider about your target range. Ask what level is too low for you. Diabetes itself doesn’t cause low blood sugar. But some treatments for diabetes may raise your risk for it. These include pills or insulin. Low blood sugar may make you pass out or have a seizure. So always treat low blood sugar right away. But don't overeat.    Special note  Always carry a source of fast-acting sugar and a snack in case of hypoglycemia. Examples include 4 glucose tablets or 1 tube of glucose gel, 1 packet of sugar or honey, or 2 tablespoons of raisins.   What you may notice   If you have low blood sugar, you may have one or more of these symptoms:   Shakiness or dizziness  Cold, clammy skin or sweating  Feeling hungry  Headache  Nervousness  A hard, fast heartbeat  Weakness  Confusion or irritability  Blurred eyesight  Having nightmares or waking up confused or sweating  Numbness or tingling in the lips or tongue  What you should do   Here are tips to follow if you have hypoglycemia:    First check your blood sugar. If it's too low (out of your target range), eat or drink 15 to 20 grams of fast-acting sugar. This may be 3 to 4 glucose tablets, 4 ounces (half a cup) of fruit juice or regular (nondiet) soda, or 1 tablespoon of honey. Don’t take more than this, or your blood sugar may go too high.  Don't eat foods high in protein such as milk or nuts to treat hypoglycemia. Protein may increase your insulin response. It may lower your blood sugar even more.  Wait 15 minutes. Then recheck your blood sugar if you can.  If your blood sugar is  still too low, repeat the steps above until your blood sugar is back to normal.  Once your blood sugar is back at target range, eat a snack or meal.  If you still don’t feel well and your blood sugar is still low, have someone drive you to your healthcare provider’s office or the hospital emergency room.   You may also want to talk with your provider to see if you should be prescribed a glucagon shot. Glucagon is a hormone that quickly raises blood sugar. It can reverse serious symptoms.   Preventing low blood sugar   Things you can do include the following:   If your condition needs a strict treatment plan, eat meals and snacks at the same times each day. Don’t skip meals!  If your treatment plan lets you change when and what you eat, learn how to change the time and dose of your rapid-acting insulin to match this.   Ask your healthcare provider if it's safe to drink alcohol. Never drink on an empty stomach.  Take your medicine at the prescribed times.  Always carry a source of fast-acting sugar and a snack when you’re away from home.  If you have had several hypoglycemic episodes, talk with your healthcare provider. Ask if you can take less medicine. Many newer types of diabetes pills and injections have less risk of causing hypoglycemia than some older medicines. You also may have a condition where you no longer recognize the symptoms of low blood sugar until the value falls to dangerous levels.  Other things to do   Other tips include:  Carry a medical ID card or wear a medical alert bracelet or necklace. It should say that you have diabetes. It should also say what to do if you pass out or have a seizure.  Make sure your family, friends, and coworkers know the signs of low blood sugar. Tell them what to do if your blood sugar falls very low and you can’t treat yourself.  Keep a glucagon emergency kit handy. Be sure your family, friends, and coworkers know how and when to use it. Check it often. Replace the  glucagon before it expires.  Always carry glucose tablets or a fast-acting form of carbohydrate with you to treat a low sugar episode  Talk with your healthcare team about other things you can do to prevent low blood sugar. These include using new ways of continuous glucose monitoring.   Important  If you have unexplained hypoglycemia or hypoglycemia several times, call your healthcare provider.       GLUCAGON  If you are an insulin user, ask your diabetes medical provider about a prescription for a glucagon emergency kit, which may help save your life in the case of life-threatening hypoglycemia.     Glucagon is an emergency rescue medication to raise blood glucose levels.  This can be used if there is loss of consciousness or seizure from hypoglycemia, or in times where you may have a significant amount of active insulin in your system but are not able to eat or drink carbs to cover that insulin (example: you just took a large mealtime dose of insulin, ate your meal, then vomited and cannot take in food or drink to replace those lost carbs).  One thing to note is that if there is alcohol in your system, glucagon is less likely to work effectively to bring blood glucose level back up, but it will not hurt to try.  If you are on insulin, it is important to have glucagon available in case of emergency and to have loved ones, family, significant other, friends aware of where you keep it and how to use it.  There are 2 types of glucagon available, injected or inhaled nasal powder.    Below are links to virtual savings card options and  websites to see how these various types of glucagon are administered:    Gvoke (Hypopen auto injector pen and pre-filled syringe):  https://www.[employer].com/  https://www.[employer].com/getting-gvoke/#copay-card    Baqsimi (inhaled nasal powder):  https://www.giftee.[employer]/  https://www."[employer]/savings-resources/    Zegalogue  (injection):  https://www.Prepair.com/  https://www.novoDiaphonics.com/diabetes/products/zegalogue.html    IF YOU ARE A DIABETES PATIENT WHO USES AN INSULIN PUMP:    Insulin Pump Back Up Plan:     Pump Infusion Set Precautions: Always be aware of your Blood Glucose (BG) trends. When placing a new cannula, pick a time early in the day so you can monitor your BG. This will reassure you that the tubing and/or the insertion set is not kinked, leaking or blocked. If there is a problem with the flow of insulin, it may take several hours for the pump to alarm or it may not alarm at all. Check your BG at regular intervals in the hours after a change. Do not go to bed right after a set change. Watch your BG and/or CGM device throughout the day to make sure there are no trends caused by lack of insulin.    What Next?: If you notice BG readings above 250 twice in 2 hours after a bolus insulin (correction) and it continues to rise, suspect a pump set failure. Check ketones.     When do we suspect pump set or pump failure?   You have symptoms of high BG: thirsty, frequent urination, tired, fruity breath, stomach aches.   If the ketones are positive   If BG >250 continue 2 hours after pump correction dose is taken.    Act fast: Stop the pump, and give the fast acting insulin correction factor by using your insulin pen or syringe/vial. To calculate: current blood sugar minus target blood sugar. Divide this number by your correction factor.  Change the insulin pump cannula, tubing and resume your basal rate. Continue to check your BG at 30 minutes and then hourly until you are sure the infusion set is working properly and the BG results stay under 250.    Pump Back Up Plan: In case you are not certain the pump or tubing is working correctly, use this plan.   Contact the insulin pump company to troubleshoot the problems. (Help numbers are on the back of the pump device)   Stop the pump and disconnect the tubing and insertion set.   If  you will be off the pump for more than 1-2 hours without a basal rate, you must correct with fast acting insulin.   Correct using fast acting insulin pen or syringe/vial with the insulin sensitivity factor (correction) every 3 hours. Use fast acting insulin injections for your carbohydrate ratio with meals.   - Administer basal insulin (Lantus, Basaglar, Semglee etc) at the dose recommended by your diabetes provider, or roughly your hourly pump basal rate x 24. Note down the time at which this dose is administered    Be careful about high or low BG results during these changes in insulin therapy. Increase drinking of un-sweetened fluids. Carry fast-acting carb choices like glucose tabs.   Ask about updating your insulin back up plan at every routine visit    Common Causes of Insulin Pump Malfunction:   Loose connections   Kinked or clogged cannula   Air bubbles in tubing. Tubing that was not correctly primed.   Cracked tubing   Dislodged infusion set or cannula came out of skin   Ineffective infusion site. Scarred skin or lipohypertrophy (fatty scars under the skin won’t absorb well).   Site irritation or infection   Empty insulin reservoir    insulin or insulin that went bad due to heat or freezing   Incorrect bolus doses or settings   Missed bolus doses (check pump memory)   Incorrect basal rate settings   Pump was left in suspend mode for more than 2 hours   Old batteries   Pump malfunction (specific pump company technician can troubleshoot if replacement is needed      ROUTINE HEALTH MAINTENANCE:    -As a diabetic patient, it is recommended you have a dilated eye exam to screen for eye complication due to diabetes, at least once every year. If it has been longer than 1 year since your last eye exam, please schedule your eye exam as soon as possible    - Perform daily self-examination of your feet. As a diabetic patient you may be at risk of nerve damage which may make it easier to miss any minor cuts and  bruises, which can lead to foot infections and ulceration.     - All diabetic patients are recommended to be taking a cholesterol - lowering treatment. If you have been prescribed one, ensure you take it regularly. This helps lower your future risk of cardiovascular health problems. If you are not on it currently, please ensure this is discussed this with your primary care provider or your diabetes medical provider during your future office visits.     Here is a list of websites with patient information about various endocrine conditions and glands.  These websites are created and maintained by national endocrine societies, such as American Association of Clinical Endocrinologists, American Thyroid Association, the American Diabetes Association, and the Endocrine Society.    Hormone Health Network (Endocrine Society):  www.hormone.org  EMPOWER (American Association of Clinical Endocrinologists): https://www.Adduplexe.com/  The American Thyroid Association: www.thyroid.org/thyroid-information  The American Diabetes Association: diabetes.org     Additionally, there is a website called [employer] which has patient education on a variety of topics.  It can be found at www.Snapverse/contents/nvmki-ix-eadvosed/patient-education or by doing a search for Up To Date patient education.  If you select \"Hormones,\" that will take you to a page of general endocrine topics and if you choose \"Diabetes,\" then you will be taken to page with diabetes specific information.       All of the above websites are routinely updated so you can be assured they reflect the most recent medical literature.     E-ADVICE MESSAGING    To clarify the appropriate use of e-messaging:    Sending an e-message  can be a convenient way to get non urgent medical advice. Depending on your needs and your schedule, this can be a great alternative to calling the clinic. Remember, for your safety, urgent or emergent medical issues should never be sent via  e-message--please call the healthcare team.     If a response to your message requires medical advice--when a physician provides their medical expertise and more than a few minutes of their time--we will request that you either schedule a visit either virtually or in person.    Examples of medical advice messages that will need an appointment:    -A new issue or symptom requiring medical assessment or a referral  -Adjusting or prescribing a new medication or supplements  -A flare-up or change in a chronic or ongoing condition that requires a workup    Examples of appropriate use of e-message:    -clarifying if an appointment is needed or what type of appointment is needed  -refills of chronic medications to last until a scheduled appointment  - clarifying labs that are required prior to an appointment  - clarifying or scheduling an option of treatment that was discussed in your last appointment     Regarding Test Results  Because your labs are now being automatically \"released\" by a computer system, it is possible you may receive notification at unusual times of day or before your physician has had an opportunity to even see them. Do not be concerned- you are not being contacted at odd hours because there is an emergency! Your test results will continue to be reviewed by your physician and results requiring follow up action will lead to a phone call from our office notifying you of such a concern. Conversely, please do not call the office after hours to address your test results. Please be aware that if you are notified of test results or messages via [employer], it is your responsibility to log in to look at them. If no special intervention is needed after testing is done, you will not receive any additional contact from the office (i.e. Calls, letters, or messages). If I your results are normal, I am able to see that you have reviewed them and no further action is needed, you may not be called or contacted by the  office until your next office appointment.    Test Performed Outside of [facility]: If you decide to have your tests performed outside this facility, it is your responsibility to contact us to confirm that the results have been received and reviewed by me.    If for some reason you do not receive a letter of phone call about abnormal results within 2 weeks after your test date, it is very important for you to contact our office.    When you review your labs, there may be things that are noted slightly out of range that are not remarked upon by your physician. Please do not be alarmed! Many of these things are seen in some portion of a normal healthy population. Other \"abnormalities\" are within lab error range. Still others are \"calculations\" rather than actual lab values (example: BUN/Cr ratio) and the individual components in the calculation are just fine.    PRESCRIPTIONS:    DIABETICS:  If you take medications to lower your glucose level, remember to always test your glucose prior to driving or operating any machinery.     Avoid planning/attempting for pregnancy without first normalizing your glucose level and discussing your plans with your physician. Bring your glucose log book to all appointments at our office.    Prescription refills:  For prescription refills, please do not send a message/request as a note to your doctor- it will actually delay the process! The best way to get a prescription refill is to ask your pharmacy to contact our office. They will then do so electronically. Please leave 72 business hours for all refills.    SCOPE OF MEDICAL CARE:  -Dr. Mahoney is caring for you as your ENDOCRINOLOGIST. He does not usually function as a primary care provider for his patients. All patients are expected to have an internal medicine or family medicine physician to provide non-endocrine care, which also includes urgent care, annual physicals, cancer screenings, and preoperative clearance.              negative